# Patient Record
Sex: FEMALE | Race: BLACK OR AFRICAN AMERICAN
[De-identification: names, ages, dates, MRNs, and addresses within clinical notes are randomized per-mention and may not be internally consistent; named-entity substitution may affect disease eponyms.]

---

## 2018-09-25 ENCOUNTER — HOSPITAL ENCOUNTER (INPATIENT)
Dept: HOSPITAL 74 - JER | Age: 43
LOS: 3 days | Discharge: NURSING, REHAB OR LONG-TERM CARE | DRG: 346 | End: 2018-09-28
Attending: NURSE PRACTITIONER | Admitting: INTERNAL MEDICINE
Payer: COMMERCIAL

## 2018-09-25 VITALS — BODY MASS INDEX: 36.9 KG/M2
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
## 2018-09-25 DIAGNOSIS — F17.210: ICD-10-CM

## 2018-09-25 DIAGNOSIS — M24.542: ICD-10-CM

## 2018-09-25 DIAGNOSIS — E66.9: ICD-10-CM

## 2018-09-25 DIAGNOSIS — M24.541: ICD-10-CM

## 2018-09-25 DIAGNOSIS — M25.561: ICD-10-CM

## 2018-09-25 DIAGNOSIS — D64.89: ICD-10-CM
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
## 2018-09-25 DIAGNOSIS — M85.88: ICD-10-CM

## 2018-09-25 DIAGNOSIS — M25.571: ICD-10-CM
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
## 2018-09-25 DIAGNOSIS — M06.9: Primary | ICD-10-CM

## 2018-09-25 LAB
ALBUMIN SERPL-MCNC: 2.8 G/DL (ref 3.4–5)
ALP SERPL-CCNC: 79 U/L (ref 45–117)
ALT SERPL-CCNC: 16 U/L (ref 13–61)
ANION GAP SERPL CALC-SCNC: 7 MMOL/L (ref 8–16)
AST SERPL-CCNC: 23 U/L (ref 15–37)
BASOPHILS # BLD: 0.7 % (ref 0–2)
BILIRUB SERPL-MCNC: 0.6 MG/DL (ref 0.2–1)
BUN SERPL-MCNC: 17 MG/DL (ref 7–18)
CALCIUM SERPL-MCNC: 8.6 MG/DL (ref 8.5–10.1)
CHLORIDE SERPL-SCNC: 102 MMOL/L (ref 98–107)
CO2 SERPL-SCNC: 26 MMOL/L (ref 21–32)
CREAT SERPL-MCNC: 0.9 MG/DL (ref 0.55–1.3)
DEPRECATED RDW RBC AUTO: 15.8 % (ref 11.6–15.6)
EOSINOPHIL # BLD: 2 % (ref 0–4.5)
GLUCOSE SERPL-MCNC: 138 MG/DL (ref 74–106)
HCT VFR BLD CALC: 33.9 % (ref 32.4–45.2)
HGB BLD-MCNC: 11.2 GM/DL (ref 10.7–15.3)
LYMPHOCYTES # BLD: 19.9 % (ref 8–40)
MCH RBC QN AUTO: 28.3 PG (ref 25.7–33.7)
MCHC RBC AUTO-ENTMCNC: 33.1 G/DL (ref 32–36)
MCV RBC: 85.5 FL (ref 80–96)
MONOCYTES # BLD AUTO: 6.8 % (ref 3.8–10.2)
NEUTROPHILS # BLD: 70.6 % (ref 42.8–82.8)
PLATELET # BLD AUTO: 441 K/MM3 (ref 134–434)
PMV BLD: 7.9 FL (ref 7.5–11.1)
POTASSIUM SERPLBLD-SCNC: 4.3 MMOL/L (ref 3.5–5.1)
PROT SERPL-MCNC: 7.2 G/DL (ref 6.4–8.2)
RBC # BLD AUTO: 3.97 M/MM3 (ref 3.6–5.2)
SODIUM SERPL-SCNC: 135 MMOL/L (ref 136–145)
WBC # BLD AUTO: 8 K/MM3 (ref 4–10)

## 2018-09-25 PROCEDURE — G0378 HOSPITAL OBSERVATION PER HR: HCPCS

## 2018-09-25 RX ADMIN — NAPROXEN SCH MG: 500 TABLET ORAL at 22:33

## 2018-09-25 RX ADMIN — PREDNISONE SCH MG: 20 TABLET ORAL at 10:22

## 2018-09-25 RX ADMIN — NICOTINE SCH MG: 7 PATCH TRANSDERMAL at 10:28

## 2018-09-25 RX ADMIN — FERROUS SULFATE TAB EC 324 MG (65 MG FE EQUIVALENT) SCH MG: 324 (65 FE) TABLET DELAYED RESPONSE at 10:22

## 2018-09-25 RX ADMIN — ENOXAPARIN SODIUM SCH MG: 40 INJECTION SUBCUTANEOUS at 10:28

## 2018-09-25 RX ADMIN — Medication SCH TAB: at 10:22

## 2018-09-25 RX ADMIN — ENOXAPARIN SODIUM SCH: 40 INJECTION SUBCUTANEOUS at 10:35

## 2018-09-25 RX ADMIN — NAPROXEN SCH MG: 500 TABLET ORAL at 10:22

## 2018-09-25 RX ADMIN — FOLIC ACID SCH MG: 1 TABLET ORAL at 10:22

## 2018-09-25 NOTE — PN
Teaching Attending Note


Name of Resident: Jatin Hess





ATTENDING PHYSICIAN STATEMENT





I saw and evaluated the patient.


I reviewed the resident's note and discussed the case with the resident.


I agree with the resident's findings and plan as documented.








SUBJECTIVE: This is a 43 year old woman with a history of RA who comes to the 

ED with pain in her right shoulder, right knee, right ankle after a fall at 

home. She states she is unable to bear weight on her right leg because of pain. 

The fall occurred in her bathroom around 8pm yesterday. She says she tripped 

and fell, landing on her right side. She ran out of her medications 2 days ago.








OBJECTIVE:


 Vital Signs











 Period  Temp  Pulse  Resp  BP Sys/Tesfaye  Pulse Ox


 


 Last 24 Hr  98.0 F-98.3 F    16-20  118-148/82-92  98-98








HEART: S1S2, RRR


LUNGS: Clear


ABDOMEN: Obese, soft, non-tender, non-distended, normal BS


EXTREMITIES: No edema 


 Laboratory Tests











  09/25/18 09/25/18 09/25/18





  04:23 04:23 04:23


 


WBC   8.0 


 


RBC   3.97 


 


Hgb   11.2 


 


Hct   33.9 


 


MCV   85.5 


 


MCH   28.3 


 


MCHC   33.1 


 


RDW   15.8 H 


 


Plt Count   441 H 


 


MPV   7.9 


 


Absolute Neuts (auto)   5.6 


 


Neutrophils %   70.6 


 


Lymphocytes %   19.9 


 


Monocytes %   6.8 


 


Eosinophils %   2.0 


 


Basophils %   0.7 


 


Nucleated RBC %   0 


 


Sodium  135 L  


 


Potassium  4.3  


 


Chloride  102  


 


Carbon Dioxide  26  


 


Anion Gap  7 L  


 


BUN  17  


 


Creatinine  0.9  


 


Creat Clearance w eGFR  > 60  


 


Random Glucose  138 H  


 


Calcium  8.6  


 


Total Bilirubin  0.6  


 


AST  23  


 


ALT  16  


 


Alkaline Phosphatase  79  


 


C-Reactive Protein  8.6 H  


 


Total Protein  7.2  


 


Albumin  2.8 L  


 


Serum Pregnancy, Qual    Negative








 Home Medications











 Medication  Instructions  Recorded


 


Calcium Carbonate/Vitamin D3 1 each PO DAILY 09/25/18





[Calcium 600 + D3 Softgel]  


 


Ferrous Sulfate 325 mg PO DAILY 09/25/18


 


Folic Acid 1 mg PO DAILY 09/25/18


 


Multivit with Iron,Minerals 1 each PO DAILY 09/25/18





[Compete]  


 


Naproxen 500 mg PO Q12H 09/25/18


 


Prednisone [Deltasone] 20 mg PO DAILY 09/25/18


 


Prenatal Vit No.129/Iron/Folic 1 tablet PO DAILY 09/25/18





[Prenatal One Daily Tablet]  


 


Sulfasalazine [Sulfasalazine Dr] 500 mg PO TID 09/25/18














ASSESSMENT AND PLAN:


This is a 43 year old woman with a history of RA who presented to the ED with 

pain in her right shoulder, right knee, right ankle, and unable to bear weight 

on her right leg after a fall at home.





1. s/p fall


   - X-rays of right knee show osteopenia, mild arthritis, medial tibial 

plateau fracture unable to be ruled out


   - X-rays of right ankle/foot show osteopenia, arthritis, valgus deformities 

of toes, superior talus spur vs avulsion


   - X-rays of right humerus show no fracture or dislocation


   - CT of pelvis/lower extremities shows no fractures, minimal bilateral knee 

joint effusions, moderate OA of ankles and feet, ventral hernias, fibroids


   - PT evaluation


2. Rheumatoid arthritis


   - Continue Prednisone, Sulfasalazine


   - Rheumatology consult


3. Nicotine dependence


   - Start nicotine patch

## 2018-09-25 NOTE — PDOC
*Physical Exam





- Vital Signs


 Last Vital Signs











Temp Pulse Resp BP Pulse Ox


 


 98.0 F   108 H  20   148/92   98 


 


 09/25/18 00:40  09/25/18 00:40  09/25/18 00:40  09/25/18 00:40  09/25/18 00:40














- Physical Exam


Comments: 





09/25/18 07:23


I as you and care of this 43-year-old with history of rheumatoid arthritis 

presenting with complain of pain to right shoulder, right knee and ankle status 

post slip and fall yesterday. X-ray done with no sig joint findings. Patient 

refused to bear weight due to pain. Patient laying on the right side on the bed 

even though she complain of right-sided pain. CAT scan of the pelvis ordered as 

per hospitalist request. Patient to be admitted for pain control and physical 

therapy evaluation due to not wanting to bear weight.


General Appearance: Yes: Nourished.  No: Apparent Distress


HEENT: positive: Normal ENT Inspection


Neck: positive: Supple


Respiratory/Chest: positive: Lungs Clear.  negative: Chest Tender, Accessory 

Muscle Use


Cardiovascular: positive: Regular Rhythm, Regular Rate


Gastrointestinal/Abdominal: positive: Normal Bowel Sounds


Musculoskeletal: positive: Normal Inspection, Other (mild right knee swelling)


Extremity: positive: Normal Inspection


Neurologic: positive: Fully Oriented, Alert, Normal Mood/Affect, Normal Response

, Motor Strength 5/5





ED Treatment Course





- LABORATORY


CBC & Chemistry Diagram: 


 09/25/18 04:23





 09/25/18 04:23





- ADDITIONAL ORDERS


Additional order review: 


 Laboratory  Results











  09/25/18 09/25/18





  04:23 04:23


 


Sodium   135 L


 


Potassium   4.3


 


Chloride   102


 


Carbon Dioxide   26


 


Anion Gap   7 L


 


BUN   17


 


Creatinine   0.9


 


Creat Clearance w eGFR   > 60


 


Random Glucose   138 H


 


Calcium   8.6


 


Total Bilirubin   0.6


 


AST   23


 


ALT   16


 


Alkaline Phosphatase   79


 


C-Reactive Protein   8.6 H


 


Total Protein   7.2


 


Albumin   2.8 L


 


Serum Pregnancy, Qual  Negative 








 











  09/25/18





  04:23


 


RBC  3.97


 


MCV  85.5


 


MCHC  33.1


 


RDW  15.8 H


 


MPV  7.9


 


Neutrophils %  70.6


 


Lymphocytes %  19.9


 


Monocytes %  6.8


 


Eosinophils %  2.0


 


Basophils %  0.7














- Medications


Given in the ED: 


ED Medications














Discontinued Medications














Generic Name Dose Route Start Last Admin





  Trade Name Freq  PRN Reason Stop Dose Admin


 


Ketorolac Tromethamine  60 mg  09/25/18 01:15  09/25/18 02:06





  Toradol Injection -  IM  09/25/18 01:16  60 mg





  ONCE ONE   Administration





     





     





     





     


 


Oxycodone/Acetaminophen  1 combo  09/25/18 03:29  09/25/18 04:45





  Percocet 5/325 -  PO  09/25/18 03:30  1 combo





  ONCE ONE   Administration





     





     





     





     














Medical Decision Making





- Medical Decision Making





09/25/18 07:26


Patient with the history of rheumatoid arthritis present with complain of pain 

to right shoulder right knee and ankle status post fall. Patient given Toradol 

IM and Percocet for pain and still reported severe pain and does not want to 

ambulate or bear weight on right foot. Patient reported history of multiple 

admissions every time she has joint pains at [facility] and refused 

to be sent home today. Case discussed with hospitalist team and patient pending 

admission after CT scan of the pelvis.


09/25/18 09:22


CT of LE with no acute findings. patient admitted under Dr. Dougie Kim 

for observation and pain control





*DC/Admit/Observation/Transfer


Diagnosis at time of Disposition: 


 Unable to bear weight





Ankle pain, right


Qualifiers:


 Chronicity: acute Qualified Code(s): M25.571 - Pain in right ankle and joints 

of right foot





Knee pain, right


Qualifiers:


 Chronicity: acute Qualified Code(s): M25.561 - Pain in right knee





Upper extremity pain, anterior


Qualifiers:


 Laterality: right Qualified Code(s): M79.601 - Pain in right arm








- Discharge Dispostion


Condition at time of disposition: Stable


Decision to Admit order: Yes





- Referrals





- Patient Instructions





- Post Discharge Activity

## 2018-09-25 NOTE — HP
CHIEF COMPLAINT: inability to bear weight





PCP: none





HISTORY OF PRESENT ILLNESS:





43 year old female with a history of rheumatoid arthritis presents s/p fall in 

her bathroom last night and inability to bear weight. She reports that she fell 

in her bathroom at around 8pm last night. Reports tripping and falling on her 

right side, hitting her pelvis, R arm, R knee, and R ankle. Reports intense 

pain and swelling in her right knee and states that she is unable to bear 

weight. Reports that she was diagnosed with RA 4 years ago, and has since been 

on several medications (sulfasalazine, prednisone, and naproxen), but she ran 

out 2 days ago. She does not have a rheumatologist. She had previously gotten 

her meds from [facility] in [place]. Patient currently denies 

chest pain, SOB, nausea, vomiting, diarrhea, fevers, chills.





ER course was notable for:


(1) XR Knee, XR ankle/humerus


(2) 


(3)





Recent Travel:





PAST MEDICAL HISTORY: Rheumatoid arthritis





PAST SURGICAL HISTORY: none





Social History:


Smoking: smoked for 10 years, initially started at 1.5 ppd and reduced to 4 cigs

/day


Alcohol: never


Drugs: never





Family History: no history of DM, HTN, 


Allergies





No Known Allergies Allergy (Verified 09/25/18 00:46)


 








HOME MEDICATIONS:


 Home Medications











 Medication  Instructions  Recorded


 


Calcium Carbonate/Vitamin D3 1 each PO DAILY 09/25/18





[Calcium 600 + D3 Softgel]  


 


Ferrous Sulfate 325 mg PO DAILY 09/25/18


 


Folic Acid 1 mg PO DAILY 09/25/18


 


Multivit with Iron,Minerals 1 each PO DAILY 09/25/18





[Compete]  


 


Naproxen 500 mg PO Q12H 09/25/18


 


Prednisone [Deltasone] 20 mg PO DAILY 09/25/18


 


Prenatal Vit No.129/Iron/Folic 1 tablet PO DAILY 09/25/18





[Prenatal One Daily Tablet]  


 


Sulfasalazine [Sulfasalazine Dr] 500 mg PO TID 09/25/18








REVIEW OF SYSTEMS


CONSTITUTIONAL: 


Absent:  fever, chills, diaphoresis, generalized weakness, malaise, loss of 

appetite, weight change


HEENT: 


Absent:  rhinorrhea, nasal congestion, throat pain, throat swelling, difficulty 

swallowing, mouth swelling, ear pain, eye pain, visual changes


CARDIOVASCULAR: 


Absent: chest pain, syncope, palpitations, irregular heart rate, lightheadedness

, peripheral edema


RESPIRATORY: 


Absent: cough, shortness of breath, dyspnea with exertion, orthopnea, wheezing, 

stridor, hemoptysis


GASTROINTESTINAL:


Absent: abdominal pain, abdominal distension, nausea, vomiting, diarrhea, 

constipation, melena, hematochezia


GENITOURINARY: 


Absent: dysuria, frequency, urgency, hesitancy, hematuria, flank pain, genital 

pain


MUSCULOSKELETAL:  arthralgia, joint swelling


Absent: myalgia, back pain, neck pain


SKIN: 


Absent: rash, itching, pallor


HEMATOLOGIC/IMMUNOLOGIC: 


Absent: easy bleeding, easy bruising, lymphadenopathy, frequent infections


ENDOCRINE:


Absent: unexplained weight gain, unexplained weight loss, heat intolerance, 

cold intolerance


NEUROLOGIC: 


Absent: headache, focal weakness or paresthesias, dizziness, unsteady gait, 

seizure, mental status changes, bladder or bowel incontinence


PSYCHIATRIC: 


Absent: anxiety, depression, suicidal or homicidal ideation, hallucinations.








PHYSICAL EXAMINATION


 Vital Signs - 24 hr











  09/25/18 09/25/18





  00:40 08:21


 


Temperature 98.0 F 98.3 F


 


Pulse Rate 108 H 


 


Pulse Rate [  98 H





Left Apical]  


 


Respiratory 20 16





Rate  


 


Blood Pressure 148/92 


 


Blood Pressure  118/82





[Left Arm]  


 


O2 Sat by Pulse 98 98





Oximetry (%)  











GENERAL: A&Ox3, no acute distress


EYES: PERRLA, EOMI


ENT: Moist mucus membranes


NECK: No JVD


LUNGS: CTA, no wheezes


HEART: RRR, no murmurs


ABDOMEN: Obese, Soft, nontender, BS present


EXTREMITIES: 2+ pulses, swelling of b/l knees noted, moreso on the R. Patient 

has restricted ROM of B/L knees due to pain. Patient has flexion contractures 

of b/l hands and radial deviation b/l


NEUROLOGICAL:  Cranial nerves II-XII intact. 








 Laboratory Results - last 24 hr











  09/25/18 09/25/18 09/25/18





  04:23 04:23 04:23


 


WBC   8.0 


 


RBC   3.97 


 


Hgb   11.2 


 


Hct   33.9 


 


MCV   85.5 


 


MCH   28.3 


 


MCHC   33.1 


 


RDW   15.8 H 


 


Plt Count   441 H 


 


MPV   7.9 


 


Absolute Neuts (auto)   5.6 


 


Neutrophils %   70.6 


 


Lymphocytes %   19.9 


 


Monocytes %   6.8 


 


Eosinophils %   2.0 


 


Basophils %   0.7 


 


Nucleated RBC %   0 


 


Sodium  135 L  


 


Potassium  4.3  


 


Chloride  102  


 


Carbon Dioxide  26  


 


Anion Gap  7 L  


 


BUN  17  


 


Creatinine  0.9  


 


Creat Clearance w eGFR  > 60  


 


Random Glucose  138 H  


 


Calcium  8.6  


 


Total Bilirubin  0.6  


 


AST  23  


 


ALT  16  


 


Alkaline Phosphatase  79  


 


C-Reactive Protein  8.6 H  


 


Total Protein  7.2  


 


Albumin  2.8 L  


 


Serum Pregnancy, Qual    Negative











ASSESSMENT/PLAN:





43 year old female with a history of rheumatoid arthritis presents s/p fall in 

her bathroom last night and inability to bear weight





#S/P Fall: patient is currently stable, in no acute distress, but would like 

admission for inability to bear weight and evaluation for RA management


-patient is unable to bear weight


-pain control with home medications, add tylenol


-physical therapy


-no fractures on x rays





#Rheumatoid Arthritis: patient is not well controlled on current medications and

/or non-compliant with therapy


-continue sulfasalazine


-continue prednisone


-continue naproxen





#Anemia: stable with hgb 11.2


-continue ferrous sulfate 325 QD


-continue folate and multivitamin





#Smoking Cessation: counseled patient on smoking cessation


-will give nicotine patch





#FEN


-no standing fluids


-lytes normal


-regular diet





#Prophylaxis


-heparin 5000 subq TID





#Disposition


-admit obs





Visit type





- Emergency Visit


Emergency Visit: Yes


ED Registration Date: 09/25/18


Care time: The patient presented to the Emergency Department on the above date 

and was hospitalized for further evaluation of their emergent condition.





- New Patient


This patient is new to me today: Yes


Date on this admission: 09/25/18





- Critical Care


Critical Care patient: No





Hospitalist Screening





- Colonoscopy Questionnaire


Colonoscopy Questionnaire: 





Colonoscopy Questionnaire








-   Patient:


50 - 75 years old and never had a screening colonoscopy: Unknown


History of colon or rectal polyps, or CA: Unknown


History of IBD, Crohn's disease or UC: Unknown


History of abdominal radiation therapy as a child: Unknown





-   Relative:


1 with colon or rectal CA, or polyps at age 60 or younger: Unknown


Colon or rectal CA diagnosed at age 45 or younger: Unknown


Multiple relatives with colon or rectal CA: Unknown





-   Outcome:


Screening Result: Negative Screen

## 2018-09-25 NOTE — PDOC
History of Present Illness





- General


Chief Complaint: Injury


Stated Complaint: FALL


Time Seen by Provider: 09/25/18 01:06


History Source: Patient





- History of Present Illness


Initial Comments: 





09/25/18 02:37


43 year old female with rheumatoid arthritis s/p slip and fall in the bathroom  

at 8pm reports pain to right upper arm, right knee and right ankle pain. no 

deformity noted. + swelling to right knee. full rom to right shoulder. denies 

LOC/ dizziness/ head trauma 





PMHX: RA





Past History





- Past Medical History


Allergies/Adverse Reactions: 


 Allergies











Allergy/AdvReac Type Severity Reaction Status Date / Time


 


No Known Allergies Allergy   Verified 09/25/18 00:46











Home Medications: 


Ambulatory Orders





Calcium Carbonate/Vitamin D3 [Calcium 600 + D3 Softgel] 1 each PO DAILY 09/25/ 18 


Ferrous Sulfate 325 mg PO DAILY 09/25/18 


Folic Acid 1 mg PO DAILY 09/25/18 


Multivit with Iron,Minerals [Compete] 1 each PO DAILY 09/25/18 


Naproxen 500 mg PO Q12H 09/25/18 


Prednisone [Deltasone] 20 mg PO DAILY 09/25/18 


Prenatal Vit No.129/Iron/Folic [Prenatal One Daily Tablet] 1 tablet PO DAILY 09/ 25/18 


Sulfasalazine [Sulfasalazine Dr] 500 mg PO TID 09/25/18 











- Suicide/Smoking/Psychosocial Hx


Smoking History: Never smoked


Have you smoked in the past 12 months: No


Information on smoking cessation initiated: No


Hx Alcohol Use: No


Drug/Substance Use Hx: No





**Review of Systems





- Review of Systems


Able to Perform ROS?: Yes


Is the patient limited English proficient: No


Constitutional: No: Symptoms Reported, See HPI, Chills, Diaphoresis, Fever, 

Loss of Appetite, Malaise, Night Sweats, Weakness, Weight Stable, Unintentional 

Wgt. Loss, Unexplained wgt Loss, Other


Musculoskeletal: Yes: Joint Pain, Joint Swelling





*Physical Exam





- Vital Signs


 Last Vital Signs











Temp Pulse Resp BP Pulse Ox


 


 98.0 F   108 H  20   148/92   98 


 


 09/25/18 00:40  09/25/18 00:40  09/25/18 00:40  09/25/18 00:40  09/25/18 00:40














- Physical Exam


General Appearance: Yes: Appropriately Dressed, Mild Distress


Gastrointestinal/Abdominal: positive: Normal Bowel Sounds, Soft


Musculoskeletal: positive: Normal Inspection


Extremity: positive: Normal Capillary Refill, Normal Inspection, Other (limited 

rom of right ankle and right knee)


Integumentary: positive: Normal Color, Dry, Warm


Neurologic: positive: Fully Oriented, Alert





ED Treatment Course





- LABORATORY


CBC & Chemistry Diagram: 


 09/25/18 04:23





 09/25/18 04:23





Progress Note





- Progress Note


Progress Note: 





A: Joint pain








P; pain control





xray





labs





admit





Medical Decision Making





- Medical Decision Making





09/25/18 03:38


patient reports pain is too severe unable to weight bear. will give meds for 

pain, will check labs and admit for PT evaluation and pain control. patient 

reports that she usually goes to [facility] recently moved to 

[place]. lives 3 flights up wit no elevator. 


09/25/18 04:35





09/25/18 





case discussed with Dr. Canada. pending CT pelvis and lower extremity prior 

to disposition. 


09/25/18 07:11


Patient signed out to Bubba Dick. pending CT results., YURI to contact 

hospitalist once results are read. 





*DC/Admit/Observation/Transfer


Diagnosis at time of Disposition: 


 Unable to bear weight





Ankle pain, right


Qualifiers:


 Chronicity: acute Qualified Code(s): M25.571 - Pain in right ankle and joints 

of right foot





Knee pain, right


Qualifiers:


 Chronicity: acute Qualified Code(s): M25.561 - Pain in right knee





Upper extremity pain, anterior


Qualifiers:


 Laterality: right Qualified Code(s): M79.601 - Pain in right arm








- Discharge Dispostion


Condition at time of disposition: Stable


Decision to Admit order: Yes





- Referrals





- Patient Instructions





- Post Discharge Activity

## 2018-09-26 RX ADMIN — NAPROXEN SCH MG: 500 TABLET ORAL at 09:45

## 2018-09-26 RX ADMIN — PREDNISONE SCH MG: 20 TABLET ORAL at 09:44

## 2018-09-26 RX ADMIN — Medication SCH TAB: at 09:44

## 2018-09-26 RX ADMIN — NAPROXEN SCH MG: 500 TABLET ORAL at 23:15

## 2018-09-26 RX ADMIN — NICOTINE SCH MG: 7 PATCH TRANSDERMAL at 09:45

## 2018-09-26 RX ADMIN — Medication SCH TAB: at 09:46

## 2018-09-26 RX ADMIN — ENOXAPARIN SODIUM SCH: 40 INJECTION SUBCUTANEOUS at 09:54

## 2018-09-26 RX ADMIN — FERROUS SULFATE TAB EC 324 MG (65 MG FE EQUIVALENT) SCH MG: 324 (65 FE) TABLET DELAYED RESPONSE at 09:44

## 2018-09-26 RX ADMIN — FOLIC ACID SCH MG: 1 TABLET ORAL at 09:45

## 2018-09-26 NOTE — CONSULT
Consult


Consult Specialty:: Rheumatology





- History of Present Illness


History of Present Illness: 


43 year old female with a history of rheumatoid arthritis, admitted after a 

fall in her bathroom last night and inability to bear weight. 





HPI.  The patient reports that 3 days ago she had locking of the right knee and 

had a fall in the bathroom with trauma to the right arma, right knee and right 

ankle.  She was not able to stand up.  Since admission she had partial 

improvement, however she still cannot walk.





Rheumatoid arthritis,  Four years ago she developed pain and swelling in 

multiple joints.  She was treated by a rheumatologist until 4 months ago at 

[facility].  Apparently she had liver toxicity with Leflunomide and she 

was nevet treated with Methotrexate,  At the presewnt time she in on 

Sulfasalazine 500 mg TID.  She was never well controlled, she developed 

deformity in hands and continued having significant pain accompanied by 2 hour 

morning stiffness.  She has never been treated with a biological DMARD. 





In the hospital CT scan of the pelvis and right lower extremity wqas negative 

for fracture.  X rays of the right knee (not weight bearing) revealed femoral-

tibial joint space normaland narrowing of catie lateral aspecto of the patello-

femoral space.   X rays of the right foot reelaed narrowing of the talo-

navicular joint and erosions in the 1st and 2nd MTPs.   Labs CRP 8.6.














- History Source


History Provided By: Patient





- Past Medical History


...LMP: 09/15/18


...Pregnant: No





- Alcohol/Substance Use


Hx Alcohol Use: No





- Smoking History


Smoking history: Never smoked


Have you smoked in the past 12 months: No


Aproximately how many cigarettes per day: 4





Home Medications





- Allergies


Allergies/Adverse Reactions: 


 Allergies











Allergy/AdvReac Type Severity Reaction Status Date / Time


 


No Known Allergies Allergy   Verified 09/25/18 00:46














- Home Medications


Home Medications: 


Ambulatory Orders





Calcium Carbonate/Vitamin D3 [Calcium 600 + D3 Softgel] 1 each PO DAILY 09/25/ 18 


Ferrous Sulfate 325 mg PO DAILY 09/25/18 


Folic Acid 1 mg PO DAILY 09/25/18 


Multivit with Iron,Minerals [Compete] 1 each PO DAILY 09/25/18 


Naproxen 500 mg PO Q12H 09/25/18 


Prednisone [Deltasone] 20 mg PO DAILY 09/25/18 


Prenatal Vit No.129/Iron/Folic [Prenatal One Daily Tablet] 1 tablet PO DAILY 09/ 25/18 


Sulfasalazine [Sulfasalazine Dr] 500 mg PO TID 09/25/18 











Review of Systems





- Review of Systems


Constitutional: reports: Malaise


Eyes: reports: No Symptoms


HENT: reports: No Symptoms


Neck: reports: No Symptoms


Cardiovascular: reports: No Symptoms


Respiratory: reports: No Symptoms


Gastrointestinal: reports: No Symptoms


Genitourinary: reports: No Symptoms


Musculoskeletal: reports: Other (See HPI)


Integumentary: reports: No Symptoms


Neurological: reports: No Symptoms





Physical Exam


Vital Signs: 


 Vital Signs











Temperature  98.0 F   09/26/18 18:00


 


Pulse Rate  91 H  09/26/18 18:00


 


Respiratory Rate  20   09/26/18 18:00


 


Blood Pressure  107/56 L  09/26/18 18:00


 


O2 Sat by Pulse Oximetry (%)  98   09/26/18 16:00











Constitutional: Yes: Moderate Distress


Eyes: Yes: WNL


HENT: Yes: WNL


Neck: Yes: WNL


Cardiovascular: Yes: WNL


Respiratory: Yes: WNL


Gastrointestinal: Yes: WNL


Musculoskeletal: Yes: Other (Swelling of both wrists, all MCP's and PIP's and 

the right knee.   The right ankle was tender.  Subluxation of all MCP's an 

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


Labs: 


 CBC, BMP





 09/25/18 04:23 





 09/25/18 04:23 





 Laboratory Tests











  09/25/18





  04:23


 


Calcium  8.6


 


Total Bilirubin  0.6


 


AST  23


 


ALT  16


 


Alkaline Phosphatase  79


 


C-Reactive Protein  8.6 H


 


Total Protein  7.2


 


Albumin  2.8 L














Problem List





- Problems


(1) Rheumatoid arthritis


Assessment/Plan: 


Rheumatoid arthritis.  Disease very active with damage in multiple joints.


Plan:  Increase Sulfasalazine to 1 gr BID.    Start Methotrexate 15 mg/ week.  

The patient requires management by a rheumatologist as outpatient and to start 

a biological DMARD asap.


At the present time she has pain related to a fall with no fractures.  I 

suggest to continue conservative management and PT for this problem.





Code(s): M06.9 - RHEUMATOID ARTHRITIS, UNSPECIFIED

## 2018-09-26 NOTE — PN
Physical Exam: 


SUBJECTIVE: Patient seen and examined. She complains of pain in multiple joints.








OBJECTIVE:





 Vital Signs











 Period  Temp  Pulse  Resp  BP Sys/Tesfaye  Pulse Ox


 


 Last 24 Hr  97.7 F-98.6 F  73-96  20-20  120-142/69-99  98-98











GENERAL: The patient is awake, alert, and fully oriented, in no acute distress.


LUNGS: Breath sounds equal, clear to auscultation bilaterally, no wheezes, no 

crackles, no accessory muscle use. 


HEART: Regular rate and rhythm, S1, S2 without murmur, rub or gallop.


ABDOMEN: Obese, soft, nontender, nondistended, normoactive bowel sounds, no 

guarding, no rebound, no hepatosplenomegaly, no masses.


EXTREMITIES: 2+ pulses, warm, well-perfused, no edema. 











Active Medications











Generic Name Dose Route Start Last Admin





  Trade Name Freq  PRN Reason Stop Dose Admin


 


Acetaminophen  650 mg  09/25/18 09:00  09/26/18 07:03





  Tylenol -  PO   650 mg





  Q4H PRN   Administration





  PAIN LEVEL 4 - 6   





     





     





     


 


Calcium Carbonate/Cholecalciferol  1 tab  09/25/18 10:00  09/25/18 10:22





  Os-Norberto 500+D -  PO   1 tab





  DAILY KALEB   Administration





     





     





     





     


 


Enoxaparin Sodium  40 mg  09/25/18 10:00  09/25/18 10:35





  Lovenox -  SQ   Not Given





  DAILY KALEB   





     





     





     





     


 


Ferrous Sulfate  325 mg  09/25/18 10:00  09/25/18 10:22





  Feosol -  PO   325 mg





  DAILY KALEB   Administration





     





     





     





     


 


Folic Acid  1 mg  09/25/18 10:00  09/25/18 10:22





  Folic Acid -  PO   1 mg





  DAILY KALEB   Administration





     





     





     





     


 


Naproxen  500 mg  09/25/18 10:00  09/25/18 22:33





  Naprosyn -  PO   500 mg





  BID KALEB   Administration





     





     





     





     


 


Nicotine  7 mg  09/25/18 10:00  09/25/18 10:28





  Nicoderm Patch -  TD   7 mg





  DAILY KALEB   Administration





     





     





     





     


 


Prednisone  20 mg  09/25/18 10:00  09/25/18 10:22





  Deltasone -  PO   20 mg





  DAILY KALEB   Administration





     





     





     





     


 


Prenatal Multivit/Folic Acid/Iron  1 tab  09/25/18 10:00  09/25/18 10:22





  Prenatal Vitamins (Sjr) -  PO   1 tab





  DAILY KALEB   Administration





     





     





     





     


 


Sulfasalazine  500 mg  09/25/18 14:00  09/26/18 07:02





  Azulfidine En-Tabs -  PO   500 mg





  TID KALEB   Administration





     





     





     





     











ASSESSMENT/PLAN:


This is a 43 year old woman with a history of RA who presented to the ED with 

pain in her right shoulder, right knee, right ankle, and unable to bear weight 

on her right leg after a fall at home.





1. s/p fall


   - X-rays of right knee show osteopenia, mild arthritis, medial tibial 

plateau fracture unable to be ruled out


   - X-rays of right ankle/foot show osteopenia, arthritis, valgus deformities 

of toes, superior talus spur vs avulsion


   - X-rays of right humerus show no fracture or dislocation


   - CT of pelvis/lower extremities shows no fractures, minimal bilateral knee 

joint effusions, moderate OA of ankles and feet, ventral hernias, fibroids


   - PT evaluation


2. Rheumatoid arthritis


   - Continue Prednisone, Sulfasalazine, Naprosyn


   - Oxycodone for pain control


   - Awaiting rheumatology consult


3. Nicotine dependence


   - Continue nicotine patch


4. Obesity with BMI 36.9

## 2018-09-27 RX ADMIN — ENOXAPARIN SODIUM SCH: 40 INJECTION SUBCUTANEOUS at 09:58

## 2018-09-27 RX ADMIN — ENOXAPARIN SODIUM SCH MG: 40 INJECTION SUBCUTANEOUS at 09:42

## 2018-09-27 RX ADMIN — NICOTINE SCH MG: 7 PATCH TRANSDERMAL at 09:42

## 2018-09-27 RX ADMIN — NAPROXEN SCH MG: 500 TABLET ORAL at 21:07

## 2018-09-27 RX ADMIN — Medication SCH TAB: at 09:52

## 2018-09-27 RX ADMIN — FOLIC ACID SCH MG: 1 TABLET ORAL at 09:41

## 2018-09-27 RX ADMIN — FERROUS SULFATE TAB EC 324 MG (65 MG FE EQUIVALENT) SCH MG: 324 (65 FE) TABLET DELAYED RESPONSE at 09:41

## 2018-09-27 RX ADMIN — NAPROXEN SCH MG: 500 TABLET ORAL at 09:42

## 2018-09-27 RX ADMIN — Medication SCH TAB: at 09:42

## 2018-09-27 RX ADMIN — PREDNISONE SCH MG: 20 TABLET ORAL at 09:42

## 2018-09-27 NOTE — PN
Physical Exam: 


SUBJECTIVE: Patient seen and examined. She has no new complaints. She ambulated 

5 feet with PT yesterday and says she will be unable to walk up stairs at home. 

She is erquesting rehab.








OBJECTIVE:





 Vital Signs











 Period  Temp  Pulse  Resp  BP Sys/Tesfaye  Pulse Ox


 


 Last 24 Hr  97.7 F-98.1 F  70-91  20-20  107-146/56-84  98-98











GENERAL: The patient is awake, alert, and fully oriented, in no acute distress.


LUNGS: Breath sounds equal, clear to auscultation bilaterally, no wheezes, no 

crackles, no accessory muscle use. 


HEART: Regular rate and rhythm, S1, S2 without murmur, rub or gallop.


ABDOMEN: Soft, nontender, nondistended, normoactive bowel sounds, no guarding, 

no rebound, no hepatosplenomegaly, no masses.


EXTREMITIES: 2+ pulses, warm, well-perfused, no edema. 








Active Medications











Generic Name Dose Route Start Last Admin





  Trade Name Freq  PRN Reason Stop Dose Admin


 


Acetaminophen  650 mg  09/26/18 09:21  





  Tylenol -  PO   





  Q4H PRN   





  PAIN LEVEL 1-5   





     





     





     


 


Calcium Carbonate/Cholecalciferol  1 tab  09/25/18 10:00  09/26/18 09:44





  Os-Norberto 500+D -  PO   1 tab





  DAILY KALEB   Administration





     





     





     





     


 


Enoxaparin Sodium  40 mg  09/25/18 10:00  09/26/18 09:54





  Lovenox -  SQ   Not Given





  DAILY KALEB   





     





     





     





     


 


Ferrous Sulfate  325 mg  09/25/18 10:00  09/26/18 09:44





  Feosol -  PO   325 mg





  DAILY KALEB   Administration





     





     





     





     


 


Folic Acid  1 mg  09/25/18 10:00  09/26/18 09:45





  Folic Acid -  PO   1 mg





  DAILY KALEB   Administration





     





     





     





     


 


Methotrexate  15 mg  09/27/18 08:00  





  Mexate -  PO   





  Q7D KALEB   





     





     





     





     


 


Naproxen  500 mg  09/25/18 10:00  09/26/18 23:15





  Naprosyn -  PO   500 mg





  BID KALEB   Administration





     





     





     





     


 


Nicotine  7 mg  09/25/18 10:00  09/26/18 09:45





  Nicoderm Patch -  TD   7 mg





  DAILY KALEB   Administration





     





     





     





     


 


Oxycodone HCl  10 mg  09/26/18 09:20  09/26/18 18:51





  Roxicodone -  PO   10 mg





  Q4H PRN   Administration





  PAIN LEVEL 6-10   





     





     





     


 


Prednisone  20 mg  09/25/18 10:00  09/26/18 09:44





  Deltasone -  PO   20 mg





  DAILY KALEB   Administration





     





     





     





     


 


Prenatal Multivit/Folic Acid/Iron  1 tab  09/25/18 10:00  09/26/18 09:46





  Prenatal Vitamins (Sjr) -  PO   1 tab





  DAILY KALEB   Administration





     





     





     





     


 


Sulfasalazine  1,000 mg  09/27/18 10:00  





  Azulfidine En-Tabs -  PO   





  BID KALEB   





     





     





     





     











ASSESSMENT/PLAN:


This is a 43 year old woman with a history of RA who presented to the ED with 

pain in her right shoulder, right knee, right ankle, and unable to bear weight 

on her right leg after a fall at home.





1. s/p fall


   - X-rays of right knee show osteopenia, mild arthritis, medial tibial 

plateau fracture unable to be ruled out


   - X-rays of right ankle/foot show osteopenia, arthritis, valgus deformities 

of toes, superior talus spur vs avulsion


   - X-rays of right humerus show no fracture or dislocation


   - CT of pelvis/lower extremities shows no fractures, minimal bilateral knee 

joint effusions, moderate OA of ankles and feet, ventral hernias, fibroids


   - PT evaluation


2. Rheumatoid arthritis


   - Rheumatology consult appreciated


   - Continue Prednisone, Naprosyn


   - Sulfasalazine increased


   - Methotrexate added


   - Continue oxycodone as needed for pain


   - Outpatient rheumatology follow up for DMARD


3. Nicotine dependence


   - Continue nicotine patch


4. Obesity with BMI 36.9


5. Disposition


   - Continue PT


   - Plan for subacute rehab

## 2018-09-28 VITALS — DIASTOLIC BLOOD PRESSURE: 78 MMHG | TEMPERATURE: 98.3 F | HEART RATE: 75 BPM | SYSTOLIC BLOOD PRESSURE: 129 MMHG

## 2018-09-28 LAB
ALBUMIN SERPL-MCNC: 2.7 G/DL (ref 3.4–5)
ALP SERPL-CCNC: 76 U/L (ref 45–117)
ALT SERPL-CCNC: 17 U/L (ref 13–61)
ANION GAP SERPL CALC-SCNC: 9 MMOL/L (ref 8–16)
AST SERPL-CCNC: 16 U/L (ref 15–37)
BASOPHILS # BLD: 0.4 % (ref 0–2)
BILIRUB CONJ SERPL-MCNC: < 0.2 MG/DL (ref 0–0.2)
BILIRUB SERPL-MCNC: 0.1 MG/DL (ref 0.2–1)
BUN SERPL-MCNC: 14 MG/DL (ref 7–18)
CALCIUM SERPL-MCNC: 9.3 MG/DL (ref 8.5–10.1)
CHLORIDE SERPL-SCNC: 104 MMOL/L (ref 98–107)
CO2 SERPL-SCNC: 26 MMOL/L (ref 21–32)
CREAT SERPL-MCNC: 0.6 MG/DL (ref 0.55–1.3)
DEPRECATED RDW RBC AUTO: 15.8 % (ref 11.6–15.6)
EOSINOPHIL # BLD: 0.3 % (ref 0–4.5)
GLUCOSE SERPL-MCNC: 151 MG/DL (ref 74–106)
HCT VFR BLD CALC: 30.7 % (ref 32.4–45.2)
HGB BLD-MCNC: 9.8 GM/DL (ref 10.7–15.3)
LYMPHOCYTES # BLD: 8.9 % (ref 8–40)
MAGNESIUM SERPL-MCNC: 1.8 MG/DL (ref 1.8–2.4)
MCH RBC QN AUTO: 27.8 PG (ref 25.7–33.7)
MCHC RBC AUTO-ENTMCNC: 32.1 G/DL (ref 32–36)
MCV RBC: 86.6 FL (ref 80–96)
MONOCYTES # BLD AUTO: 2.5 % (ref 3.8–10.2)
NEUTROPHILS # BLD: 87.9 % (ref 42.8–82.8)
PLATELET # BLD AUTO: 408 K/MM3 (ref 134–434)
PMV BLD: 7.6 FL (ref 7.5–11.1)
POTASSIUM SERPLBLD-SCNC: 4.4 MMOL/L (ref 3.5–5.1)
PROT SERPL-MCNC: 6.8 G/DL (ref 6.4–8.2)
RBC # BLD AUTO: 3.54 M/MM3 (ref 3.6–5.2)
SODIUM SERPL-SCNC: 139 MMOL/L (ref 136–145)
WBC # BLD AUTO: 8.7 K/MM3 (ref 4–10)

## 2018-09-28 RX ADMIN — ENOXAPARIN SODIUM SCH: 40 INJECTION SUBCUTANEOUS at 11:02

## 2018-09-28 RX ADMIN — FERROUS SULFATE TAB EC 324 MG (65 MG FE EQUIVALENT) SCH MG: 324 (65 FE) TABLET DELAYED RESPONSE at 11:02

## 2018-09-28 RX ADMIN — NAPROXEN SCH MG: 500 TABLET ORAL at 11:03

## 2018-09-28 RX ADMIN — FOLIC ACID SCH MG: 1 TABLET ORAL at 11:02

## 2018-09-28 RX ADMIN — Medication SCH TAB: at 11:02

## 2018-09-28 RX ADMIN — PREDNISONE SCH MG: 20 TABLET ORAL at 11:02

## 2018-09-28 RX ADMIN — Medication SCH TAB: at 11:04

## 2018-09-28 RX ADMIN — NICOTINE SCH MG: 7 PATCH TRANSDERMAL at 11:02

## 2018-09-28 NOTE — DS
Physical Exam: 


SUBJECTIVE: Patient seen and examined








OBJECTIVE:





 Vital Signs











 Period  Temp  Pulse  Resp  BP Sys/Tesfaye  Pulse Ox


 


 Last 24 Hr  97.7 F-98.4 F  71-90  20-20  129-158/73-92  98-98








PHYSICAL EXAM








GENERAL: The patient is awake, alert, and fully oriented, in no acute distress.


LUNGS: Breath sounds equal, clear to auscultation bilaterally, no wheezes, no 

crackles, no accessory muscle use. 


HEART: Regular rate and rhythm, S1, S2 without murmur, rub or gallop.


ABDOMEN: Obese, soft, nontender, nondistended, normoactive bowel sounds, no 

guarding, no rebound, no hepatosplenomegaly, no masses.


EXTREMITIES: 2+ pulses, warm, well-perfused, no edema. 





LABS


 Laboratory Results - last 24 hr











  09/28/18 09/28/18 09/28/18





  14:20 14:20 14:20


 


WBC   8.7 


 


RBC   3.54 L 


 


Hgb   9.8 L 


 


Hct   30.7 L 


 


MCV   86.6 


 


MCH   27.8 


 


MCHC   32.1 


 


RDW   15.8 H 


 


Plt Count   408 


 


MPV   7.6 


 


Absolute Neuts (auto)   7.7 


 


Neutrophils %   87.9 H D 


 


Lymphocytes %   8.9  D 


 


Monocytes %   2.5 L 


 


Eosinophils %   0.3  D 


 


Basophils %   0.4 


 


Nucleated RBC %   0 


 


Sodium  139  


 


Potassium  4.4  


 


Chloride  104  


 


Carbon Dioxide  26  


 


Anion Gap  9  


 


BUN  14  


 


Creatinine  0.6  


 


Creat Clearance w eGFR  > 60  


 


Random Glucose  151 H  


 


Calcium  9.3  


 


Magnesium  1.8  


 


Total Bilirubin    0.1 L


 


Direct Bilirubin    < 0.2


 


AST    16


 


ALT    17


 


Alkaline Phosphatase    76


 


Total Protein    6.8


 


Albumin    2.7 L











HOSPITAL COURSE:





Date of Admission:09/28/18





Date of Discharge: 09/28/18





This is a 43 year old woman with a history of RA who presented to the ED with 

pain in her right shoulder, right knee, right ankle, and unable to bear weight 

on her right leg after a fall at home.





1. s/p fall


   - X-rays of right knee show osteopenia, mild arthritis, medial tibial 

plateau fracture unable to be ruled out


   - X-rays of right ankle/foot show osteopenia, arthritis, valgus deformities 

of toes, superior talus spur vs avulsion


   - X-rays of right humerus show no fracture or dislocation


   - CT of pelvis/lower extremities shows no fractures, minimal bilateral knee 

joint effusions, moderate OA of ankles and feet, ventral hernias, fibroids


   - PT evaluation





2. Rheumatoid arthritis


   - Continue Prednisone, Sulfasalazine, Naprosyn


   - Oxycodone for pain control


   


3. Nicotine dependence


   - Continue nicotine patch


4. Obesity with BMI 36.9





Minutes to complete discharge: 35





Discharge Summary


Reason For Visit: RIGHT KNEE AND ANKLE PAIN, INABILITY TO BEAR WEIGH


Current Active Problems





Ankle pain, right (Acute)


Knee pain, right (Acute)


Rheumatoid arthritis (Acute)


Unable to bear weight (Acute)


Upper extremity pain, anterior (Acute)








Condition: Improved





- Instructions


Disposition: SKILLED NURSING FACILITY





- Home Medications


Comprehensive Discharge Medication List: 


Ambulatory Orders





Calcium Carbonate/Vitamin D3 [Calcium 600 + D3 Softgel] 1 each PO DAILY 09/25/ 18 


Ferrous Sulfate 325 mg PO DAILY 09/25/18 


Folic Acid 1 mg PO DAILY 09/25/18 


Multivit with Iron,Minerals [Compete] 1 each PO DAILY 09/25/18 


Naproxen 500 mg PO Q12H 09/25/18 


Prednisone [Deltasone] 20 mg PO DAILY 09/25/18 


Prenatal Vit No.129/Iron/Folic [Prenatal One Daily Tablet] 1 tablet PO DAILY 09/ 25/18 


Sulfasalazine [Sulfasalazine Dr] 500 mg PO TID 09/25/18 








This patient is new to me today: Yes


Date on this admission: 09/28/18


Emergency Visit: Yes


ED Registration Date: 09/28/18


Care time: The patient presented to the Emergency Department on the above date 

and was hospitalized for further evaluation of their emergent condition.


Critical Care patient: No





- Discharge Referral


Referred to [facility] No

## 2019-02-14 ENCOUNTER — HOSPITAL ENCOUNTER (INPATIENT)
Dept: HOSPITAL 74 - JER | Age: 44
LOS: 8 days | Discharge: NURSING, REHAB OR LONG-TERM CARE | DRG: 346 | End: 2019-02-22
Attending: INTERNAL MEDICINE | Admitting: GENERAL ACUTE CARE HOSPITAL
Payer: COMMERCIAL

## 2019-02-14 VITALS — BODY MASS INDEX: 32.5 KG/M2

## 2019-02-14 DIAGNOSIS — K43.9: ICD-10-CM

## 2019-02-14 DIAGNOSIS — T38.0X5A: ICD-10-CM

## 2019-02-14 DIAGNOSIS — F17.210: ICD-10-CM

## 2019-02-14 DIAGNOSIS — E66.9: ICD-10-CM

## 2019-02-14 DIAGNOSIS — R60.0: ICD-10-CM

## 2019-02-14 DIAGNOSIS — D50.9: ICD-10-CM

## 2019-02-14 DIAGNOSIS — M06.9: Primary | ICD-10-CM

## 2019-02-14 DIAGNOSIS — R82.71: ICD-10-CM

## 2019-02-14 DIAGNOSIS — K03.81: ICD-10-CM
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
## 2019-02-14 DIAGNOSIS — Z91.14: ICD-10-CM

## 2019-02-14 LAB
ALBUMIN SERPL-MCNC: 2.6 G/DL (ref 3.4–5)
ALP SERPL-CCNC: 65 U/L (ref 45–117)
ALT SERPL-CCNC: 8 U/L (ref 13–61)
ANION GAP SERPL CALC-SCNC: 8 MMOL/L (ref 8–16)
AST SERPL-CCNC: 8 U/L (ref 15–37)
BASOPHILS # BLD: 1 % (ref 0–2)
BILIRUB SERPL-MCNC: 0.2 MG/DL (ref 0.2–1)
BUN SERPL-MCNC: 8 MG/DL (ref 7–18)
CALCIUM SERPL-MCNC: 8.6 MG/DL (ref 8.5–10.1)
CHLORIDE SERPL-SCNC: 105 MMOL/L (ref 98–107)
CO2 SERPL-SCNC: 27 MMOL/L (ref 21–32)
CREAT SERPL-MCNC: 0.6 MG/DL (ref 0.55–1.3)
DEPRECATED RDW RBC AUTO: 18.1 % (ref 11.6–15.6)
EOSINOPHIL # BLD: 1.9 % (ref 0–4.5)
GLUCOSE SERPL-MCNC: 106 MG/DL (ref 74–106)
HCT VFR BLD CALC: 24.7 % (ref 32.4–45.2)
HGB BLD-MCNC: 8.1 GM/DL (ref 10.7–15.3)
LYMPHOCYTES # BLD: 23.1 % (ref 8–40)
MCH RBC QN AUTO: 24.2 PG (ref 25.7–33.7)
MCHC RBC AUTO-ENTMCNC: 32.7 G/DL (ref 32–36)
MCV RBC: 74.2 FL (ref 80–96)
MONOCYTES # BLD AUTO: 4.5 % (ref 3.8–10.2)
NEUTROPHILS # BLD: 69.5 % (ref 42.8–82.8)
PLATELET # BLD AUTO: 628 K/MM3 (ref 134–434)
PMV BLD: 7.4 FL (ref 7.5–11.1)
POTASSIUM SERPLBLD-SCNC: 3.5 MMOL/L (ref 3.5–5.1)
PROT SERPL-MCNC: 7.3 G/DL (ref 6.4–8.2)
RBC # BLD AUTO: 3.33 M/MM3 (ref 3.6–5.2)
SODIUM SERPL-SCNC: 140 MMOL/L (ref 136–145)
WBC # BLD AUTO: 7.3 K/MM3 (ref 4–10)

## 2019-02-14 PROCEDURE — G0378 HOSPITAL OBSERVATION PER HR: HCPCS

## 2019-02-14 RX ADMIN — FERROUS SULFATE TAB EC 324 MG (65 MG FE EQUIVALENT) SCH MG: 324 (65 FE) TABLET DELAYED RESPONSE at 17:10

## 2019-02-14 RX ADMIN — ENOXAPARIN SODIUM SCH: 40 INJECTION SUBCUTANEOUS at 11:56

## 2019-02-14 RX ADMIN — METHOTREXATE SCH MG: 2.5 TABLET ORAL at 22:17

## 2019-02-14 RX ADMIN — NAPROXEN SCH MG: 500 TABLET ORAL at 21:14

## 2019-02-14 RX ADMIN — NICOTINE SCH MG: 14 PATCH, EXTENDED RELEASE TRANSDERMAL at 11:50

## 2019-02-14 RX ADMIN — NAPROXEN SCH MG: 500 TABLET ORAL at 17:10

## 2019-02-14 NOTE — EKG
Test Reason : 

Blood Pressure : ***/*** mmHG

Vent. Rate : 085 BPM     Atrial Rate : 085 BPM

   P-R Int : 158 ms          QRS Dur : 092 ms

    QT Int : 356 ms       P-R-T Axes : 046 045 043 degrees

   QTc Int : 423 ms

 

*** POOR DATA QUALITY, INTERPRETATION MAY BE ADVERSELY AFFECTED

NORMAL SINUS RHYTHM

NONSPECIFIC T WAVE ABNORMALITY

ABNORMAL ECG

NO PREVIOUS ECGS AVAILABLE

Confirmed by VANNESA VENTURA MD ([id number]) on 2/14/2019 3:40:15 PM

 

Referred By:             Confirmed By:VANNESA VENTURA MD

## 2019-02-14 NOTE — PN
Teaching Attending Note


Name of Resident: Murray Johnson





ATTENDING PHYSICIAN STATEMENT





I saw and evaluated the patient.


I reviewed the resident's note and discussed the case with the resident.


I agree with the resident's findings and plan as documented.








SUBJECTIVE: Complains of R knee and L ankle pain/swelling, inability to weight 

bear. No fever/chills. No history of trauma.








OBJECTIVE: Afebrile, Hemodynamically Stable.


 Last Vital Signs











Temp Pulse Resp BP Pulse Ox


 


 98.3 F   76   18   120/72   100 


 


 02/14/19 13:43  02/14/19 13:43  02/14/19 13:43  02/14/19 13:43  02/14/19 11:15








HEENT - Atraumatic, Normocephalic


Heart - S1, S2, RRR


Lungs - clear to auscultation


Abdomen - High BMI. Soft, non-tender. Bowel Sounds normal.


Neuro - AAO x 3. Tone/Power normal all 4 extremities.


MS - bilateral knee and ankle swelling and tenderness.


Extremities - no calf tenderness. [place] deformities bilateral hands.


 Laboratory Results - last 24 hr











  02/14/19 02/14/19





  05:18 05:18


 


WBC  7.3 


 


RBC  3.33 L 


 


Hgb  8.1 L 


 


Hct  24.7 L D 


 


MCV  74.2 L 


 


MCH  24.2 L D 


 


MCHC  32.7 


 


RDW  18.1 H 


 


Plt Count  628 H D 


 


MPV  7.4 L 


 


Absolute Neuts (auto)  5.0 


 


Neutrophils %  69.5  D 


 


Lymphocytes %  23.1  D 


 


Monocytes %  4.5 


 


Eosinophils %  1.9  D 


 


Basophils %  1.0 


 


Nucleated RBC %  0 


 


Sodium   140


 


Potassium   3.5


 


Chloride   105


 


Carbon Dioxide   27


 


Anion Gap   8


 


BUN   8


 


Creatinine   0.6


 


Creat Clearance w eGFR   > 60


 


Random Glucose   106


 


Calcium   8.6


 


Total Bilirubin   0.2


 


AST   8 L


 


ALT   8 L


 


Alkaline Phosphatase   65


 


Troponin I   < 0.02


 


Total Protein   7.3


 


Albumin   2.6 L








 Current Medications











Generic Name Dose Route Start Last Admin





  Trade Name Freq  PRN Reason Stop Dose Admin


 


Enoxaparin Sodium  40 mg  02/14/19 10:00  02/14/19 11:56





  Lovenox -  SQ   Not Given





  [facility]ILY [facility]   





     





     





     





     


 


Ferrous Sulfate  325 mg  02/14/19 17:30  





  Feosol -  PO   





  BIDWM [facility]   





     





     





     





     


 


Naproxen  500 mg  02/14/19 18:00  





  Naprosyn -  PO   





  BID KALEB   





     





     





     





     


 


Nicotine  14 mg  02/14/19 10:00  02/14/19 11:50





  Nicoderm Patch -  TD   14 mg





  DAILY KALEB   Administration





     





     





     





     


 


Sulfasalazine  1,000 mg  02/14/19 10:00  02/14/19 11:51





  Azulfidine En-Tabs -  PO   1,000 mg





  BID KALEB   Administration





     





     





     





     








 Home Medications











 Medication  Instructions  Recorded


 


Calcium Carbonate/Vitamin D3 1 each PO DAILY 09/25/18





[Calcium 600 + Vit D 400 Softgl]  


 


Ferrous Sulfate 325 mg PO DAILY 09/25/18


 


Folic Acid 1 mg PO DAILY 09/25/18


 


Multivit with Iron,Minerals 1 each PO DAILY 09/25/18





[Compete]  


 


Naproxen 500 mg PO Q12H 09/25/18


 


Prednisone [Deltasone] 20 mg PO DAILY 09/25/18


 


Prenatal Vit No.129/Iron/Folic 1 tablet PO DAILY 09/25/18





[Prenatal One Daily Tablet]  


 


Acetaminophen [Tylenol .Regular 650 mg PO Q4H PRN  tablet 09/28/18





Strength -]  


 


Methotrexate [Mexate -] 15 mg PO Th@1000  tablet 09/28/18


 


Nicotine Patch [Nicoderm Patch -] 7 mg TD DAILY  patch 09/28/18


 


Sulfasalazine [Azulfidine En-Tabs 1,000 mg PO BID  tablet. 09/28/18





-]  














ASSESSMENT AND PLAN:





44 year old female with history of RA (non-compliant with MTX, Sulfasalazine, 

Prednisone), Chronic REID, presents with inability to weight bear due to knee 

and ankle pain/swelling as well as R shoulder pain. No history of trauma. 





1. Acute RA Flare - sec to non-compliance with anti-rheumatoid meds (recently 

moved to [place] and was unable to attend her physician's office for refills) 


Shoulder Xray - R glenohumeral joint arthropathy, no fracture/dislocation.


Resume Sulfasalazine, Naproxen


Given 1 dose of Prednisone in ED


Rheumatology consulted for recommendations regarding steroid and MTX therapy.


PT





2. Chronic REID with thrombocytosis


H/H 2.1/24.3/MCV 74


Thrombocytosis likely sec to Iron deficiency


Last [place] 2015 - polyps as per patient.


Will give 1 dose IV Venofer and subsequent oral Ferrous Sulfate.


Will add Ferritin/Iron studies to earlier labs.





3. Smoker - Counseled - offered Nicotine patch.





DVT Px - Lovenox.

## 2019-02-14 NOTE — HP
CHIEF COMPLAINT: unable to ambulate





PCP: No PCP currently





HISTORY OF PRESENT ILLNESS:


45 y/o F w/PMH of RA and iron def anemia presents to the ER for inability to 

ambulate. She was walking down the stairs of her building today when she felt 

her R ankle buckle and had difficulty bearing weight and lowered herself down 

using banister slowly. No head trauma or LOC reported and was with her daughter 

at the time. EMS was called and was brought to the ER. She has had progressive 

worsening of her arthritis over the last 2 weeks and increased swelling in her 

joints over the last 2 weeks. She has not had her meds for 2 weeks due to not 

having a PCP or any other medical follow up while moving to [place] 7 months 

ago from [place]. She also complains of R shoulder pain and decreased ROM in 

both shoulders. She normally ambulates using walker. She denies any N/V/F/C, HA

, light-headedness, CP, SOB, abd pain, change in urinary or bowel habits.





ER course was notable for:


(1) Prednisone, Toradol, Naproxen


(2) R Shoulder XR, R ankle/foot XR


(3)





Recent Travel: No recent travel





PAST MEDICAL HISTORY: RA, iron def anemia





PAST SURGICAL HISTORY: Abd ex lap 1996





Social History:


Smoking: Currently smokes 1ppd, smokes on and off for most adult life


Alcohol: Denies


Drugs:  Denies





Family History: n-c


Allergies





No Known Allergies Allergy (Verified 02/14/19 01:54)


 








HOME MEDICATIONS:


 Home Medications











 Medication  Instructions  Recorded


 


Calcium Carbonate/Vitamin D3 1 each PO DAILY 09/25/18





[Calcium 600 + Vit D 400 Softgl]  


 


Ferrous Sulfate 325 mg PO DAILY 09/25/18


 


Folic Acid 1 mg PO DAILY 09/25/18


 


Multivit with Iron,Minerals 1 each PO DAILY 09/25/18





[Compete]  


 


Naproxen 500 mg PO Q12H 09/25/18


 


Prednisone [Deltasone] 20 mg PO DAILY 09/25/18


 


Prenatal Vit No.129/Iron/Folic 1 tablet PO DAILY 09/25/18





[Prenatal One Daily Tablet]  


 


Acetaminophen [Tylenol .Regular 650 mg PO Q4H PRN  tablet 09/28/18





Strength -]  


 


Methotrexate [Mexate -] 15 mg PO Th@1000  tablet 09/28/18


 


Nicotine Patch [Nicoderm Patch -] 7 mg TD DAILY  patch 09/28/18


 


Sulfasalazine [Azulfidine En-Tabs 1,000 mg PO BID  tablet. 09/28/18





-]  








REVIEW OF SYSTEMS


CONSTITUTIONAL: 


Absent:  fever, chills, diaphoresis, generalized weakness


CARDIOVASCULAR: 


Absent: chest pain, syncope, lightheadedness


RESPIRATORY: 


Absent: cough, shortness of breath, dyspnea with exertion


GASTROINTESTINAL:


Absent: abdominal pain, nausea, vomiting, diarrhea


GENITOURINARY: 


Absent: dysuria, frequency, urgency, hesitancy, hematuria, flank pain, genital 

pain


MUSCULOSKELETAL:  R shoulder pain, R ankle pain


NEUROLOGIC: 


Absent: headache, dizziness








PHYSICAL EXAMINATION


 Vital Signs - 24 hr











  02/14/19 02/14/19





  00:55 06:53


 


Temperature 97.6 F 98.4 F


 


Pulse Rate 98 H 


 


Pulse Rate [  71





Right Apical]  


 


Respiratory 20 17





Rate  


 


Blood Pressure 157/88 


 


Blood Pressure  132/76





[Right Arm]  


 


O2 Sat by Pulse 98 98





Oximetry (%)  











GENERAL: Awake, alert, and fully oriented, in no acute distress.


HEAD: Normal with no signs of trauma.


EYES: extraocular movements intact, sclera anicteric, conjunctiva clear.


EARS, NOSE, THROAT: Ears normal, nares patent, Moist mucous membranes.


NECK: Normal range of motion, supple


LUNGS: Breath sounds equal, clear to auscultation bilaterally. No wheezes, and 

no crackles. No accessory muscle use.


HEART: Regular rate and rhythm, normal S1 and S2


ABDOMEN: Soft, obese, nontender, not distended, normoactive bowel sounds, no 

guarding, no rebound, no masses.


MUSCULOSKELETAL: [place] necking of b/l hands. Decreased ROM of b/l UE at 

shoulders. Decreased ROM at b/l ankles.


LOWER EXTREMITIES: warm, well-perfused. Swelling at b/l ankles.


NEUROLOGICAL:  Cranial nerves II-XII grossly intact. Normal speech. Gait not 

observed.


PSYCHIATRIC: Cooperative. Good eye contact. Appropriate mood and affect.


SKIN: Warm, dry





 Laboratory Results - last 24 hr











  02/14/19 02/14/19





  05:18 05:18


 


WBC  7.3 


 


RBC  3.33 L 


 


Hgb  8.1 L 


 


Hct  24.7 L D 


 


MCV  74.2 L 


 


MCH  24.2 L D 


 


MCHC  32.7 


 


RDW  18.1 H 


 


Plt Count  628 H D 


 


MPV  7.4 L 


 


Absolute Neuts (auto)  5.0 


 


Neutrophils %  69.5  D 


 


Lymphocytes %  23.1  D 


 


Monocytes %  4.5 


 


Eosinophils %  1.9  D 


 


Basophils %  1.0 


 


Nucleated RBC %  0 


 


Sodium   140


 


Potassium   3.5


 


Chloride   105


 


Carbon Dioxide   27


 


Anion Gap   8


 


BUN   8


 


Creatinine   0.6


 


Creat Clearance w eGFR   > 60


 


Random Glucose   106


 


Calcium   8.6


 


Total Bilirubin   0.2


 


AST   8 L


 


ALT   8 L


 


Alkaline Phosphatase   65


 


Troponin I   < 0.02


 


Total Protein   7.3


 


Albumin   2.6 L











Imaging:


R shoulder XR: R glenohumeral joint atrophy. No acute fracture. No dislocation.


R ankle/foot XR: pending read





Active Medications





Enoxaparin Sodium (Lovenox -)  40 mg SQ DAILY KALEB


Ferrous Sulfate (Feosol -)  325 mg PO BIDWM KALEB


Naproxen (Naprosyn -)  500 mg PO BID KALEB


Nicotine (Nicoderm Patch -)  14 mg TD DAILY KALEB


Sulfasalazine (Azulfidine En-Tabs -)  1,000 mg PO BID KALEB








ASSESSMENT/PLAN:


45 y/o F w/PMH of RA and iron def anemia presents to the ER for inability to 

ambulate. Admitted for observation for RA flare.





-RA flare


   -Rheumatology consult


   -Sulfasalazine 1000 mg bid


   -Naproxen 500 mg q12h


   -PT


   -Given Toradol, Prednisone, Naproxen in ER.


   -Will hold off on methotrexate and prednisone and will follow rheum 

recommendations


   -f/u foot/ankle XR





-Iron deficiency anemia


   -IV Venofer 200 mg


   -Fe Sulfate 325 mg bid


   -Follow H/H, no active signs of bleeding





-Thrombocytosis


   -likely secondary to iron deficiency


   -continue to monitor





-Nicotine dependance


   -14 mg TD nicotine patch





-DVT ppx


   -Lovenox





-FEN


   -No fluids at this time


   -Monitor electrolytes


   -Regular diet





-Dispo: admit for observation to m/s. Spoke with social work office and left 

message for SW covering ER for placement into NH for rehab.





Visit type





- Emergency Visit


Emergency Visit: Yes


ED Registration Date: 02/14/19


Care time: The patient presented to the Emergency Department on the above date 

and was hospitalized for further evaluation of their emergent condition.





- New Patient


This patient is new to me today: Yes


Date on this admission: 02/14/19





- Critical Care


Critical Care patient: No

## 2019-02-14 NOTE — CONSULT
Consult


Consult Specialty:: Rheumatology





- History of Present Illness


History of Present Illness: 


44 year old female with a history of rheumatoid arthritis, admitted after right 

ankle buckling and inability to bear weight. 





Rheumatoid arthritis,  The patient has a 4 year history of arthralgia in 

multiple joints however she probably has rheumatoid arthritis for a much longer 

period based on the severity of joint damage.. She was treated by a 

rheumatologist until 9 months ago at [facility].  Apparently she had 

liver toxicity with Leflunomide and she had never treated with Methotrexate or 

a biological DMARD. She has not seen a rheumatologist as outpatient in [place].  She 

was admitted to this hospital on September 2018 and since then she is in 

Sulfasalazine 1 gr BID and Methotrexate 15 mg once per week and Prednisone was 

increased to 20 mg/d.  At the present time she rports pain mainly in shoulders 

and hands.  Since she was admitted she has not been able to stand up.  She has 

3 hours morning stiffness.      The patient reports she has an appointment 

pending with a rheumatologist.








- History Source


History Provided By: Patient, Medical Record





- Past Medical History


...LMP: 09/15/18





- Alcohol/Substance Use


Hx Alcohol Use: No





- Smoking History


Smoking history: Never smoked


Have you smoked in the past 12 months: No


Aproximately how many cigarettes per day: 4





Home Medications





- Allergies


Allergies/Adverse Reactions: 


 Allergies











Allergy/AdvReac Type Severity Reaction Status Date / Time


 


No Known Allergies Allergy   Verified 02/14/19 01:54














- Home Medications


Home Medications: 


Ambulatory Orders





Calcium Carbonate/Vitamin D3 [Calcium 600 + Vit D 400 Softgl] 1 each PO DAILY 09 /25/18 


Ferrous Sulfate 325 mg PO DAILY 09/25/18 


Folic Acid 1 mg PO DAILY 09/25/18 


Multivit with Iron,Minerals [Compete] 1 each PO DAILY 09/25/18 


Naproxen 500 mg PO Q12H 09/25/18 


Prednisone [Deltasone] 20 mg PO DAILY 09/25/18 


Prenatal Vit No.129/Iron/Folic [Prenatal One Daily Tablet] 1 tablet PO DAILY 09/ 25/18 


Acetaminophen [Tylenol .Regular Strength -] 650 mg PO Q4H PRN  tablet 09/28/18 


Methotrexate [Mexate -] 15 mg PO Th@1000  tablet 09/28/18 


Nicotine Patch [Nicoderm Patch -] 7 mg TD DAILY  patch 09/28/18 


Sulfasalazine [Azulfidine En-Tabs -] 1,000 mg PO BID  tablet. 09/28/18 











Review of Systems





- Review of Systems


Constitutional: reports: Malaise


Eyes: reports: No Symptoms


HENT: reports: No Symptoms


Neck: reports: No Symptoms


Cardiovascular: reports: No Symptoms


Respiratory: reports: No Symptoms


Gastrointestinal: reports: No Symptoms


Musculoskeletal: reports: Other (See HPI)


Endocrine: reports: No Symptoms





Physical Exam


Vital Signs: 


 Vital Signs











Temperature  98.0 F   02/14/19 19:29


 


Pulse Rate  77   02/14/19 19:29


 


Respiratory Rate  18   02/14/19 19:29


 


Blood Pressure  130/73   02/14/19 19:29


 


O2 Sat by Pulse Oximetry (%)  100   02/14/19 11:15











Constitutional: Yes: Mild Distress


Eyes: Yes: WNL


HENT: Yes: WNL


Neck: Yes: WNL


Cardiovascular: Yes: WNL


Respiratory: Yes: WNL


Gastrointestinal: Yes: WNL


Musculoskeletal: Yes: Other (Wrists, MCPs in both hands were swollen. Right 3rd 

and 4th PIPs.  Significant damage in hands with MCP subluxation and Butoniere 

deformities. Tendenrss and swelling ion both knees.  tenderness in ankles.)


Labs: 


 CBC, BMP





 02/14/19 05:18 





 02/14/19 05:18 











Problem List





- Problems


(1) Rheumatoid arthritis


Assessment/Plan: 


Sero-positive rheumatoid arthritis not treated adequately for many years and 

significant damage in multiple joints.   Admitted with ankle buckling. She 

requires higher dose of Methotrexate and treatment with biological DMARDs that 

hopefully she will get as outpatient once she gets with a scheduled 

rheumatology appointment.


Plan: Increase Methotrexate to 20 mg once per week.  Continue Sulfasalazine 1 

gr BID.  I suggest to start tapering down Prednisone.


Code(s): M06.9 - RHEUMATOID ARTHRITIS, UNSPECIFIED

## 2019-02-15 LAB
ALBUMIN SERPL-MCNC: 2.4 G/DL (ref 3.4–5)
ALP SERPL-CCNC: 60 U/L (ref 45–117)
ALT SERPL-CCNC: 9 U/L (ref 13–61)
ANION GAP SERPL CALC-SCNC: 8 MMOL/L (ref 8–16)
APPEARANCE UR: (no result)
AST SERPL-CCNC: 11 U/L (ref 15–37)
BACTERIA #/AREA URNS HPF: (no result) /HPF
BASOPHILS # BLD: 1.2 % (ref 0–2)
BILIRUB SERPL-MCNC: 0.1 MG/DL (ref 0.2–1)
BILIRUB UR STRIP.AUTO-MCNC: NEGATIVE MG/DL
BUN SERPL-MCNC: 10 MG/DL (ref 7–18)
CALCIUM SERPL-MCNC: 8.4 MG/DL (ref 8.5–10.1)
CHLORIDE SERPL-SCNC: 107 MMOL/L (ref 98–107)
CO2 SERPL-SCNC: 25 MMOL/L (ref 21–32)
COLOR UR: YELLOW
CREAT SERPL-MCNC: 0.6 MG/DL (ref 0.55–1.3)
DEPRECATED RDW RBC AUTO: 18.3 % (ref 11.6–15.6)
EOSINOPHIL # BLD: 2 % (ref 0–4.5)
EPITH CASTS URNS QL MICRO: (no result) /HPF
GLUCOSE SERPL-MCNC: 93 MG/DL (ref 74–106)
HCT VFR BLD CALC: 23.5 % (ref 32.4–45.2)
HGB BLD-MCNC: 7.8 GM/DL (ref 10.7–15.3)
KETONES UR QL STRIP: NEGATIVE
LEUKOCYTE ESTERASE UR QL STRIP.AUTO: (no result)
LYMPHOCYTES # BLD: 28.3 % (ref 8–40)
MAGNESIUM SERPL-MCNC: 2 MG/DL (ref 1.8–2.4)
MCH RBC QN AUTO: 24.7 PG (ref 25.7–33.7)
MCHC RBC AUTO-ENTMCNC: 33.3 G/DL (ref 32–36)
MCV RBC: 74.1 FL (ref 80–96)
MONOCYTES # BLD AUTO: 7.4 % (ref 3.8–10.2)
MUCOUS THREADS URNS QL MICRO: (no result)
NEUTROPHILS # BLD: 61.1 % (ref 42.8–82.8)
NITRITE UR QL STRIP: NEGATIVE
PH UR: 5 [PH] (ref 5–8)
PHOSPHATE SERPL-MCNC: 4.1 MG/DL (ref 2.5–4.9)
PLATELET # BLD AUTO: 557 K/MM3 (ref 134–434)
PMV BLD: 7.8 FL (ref 7.5–11.1)
POTASSIUM SERPLBLD-SCNC: 3.8 MMOL/L (ref 3.5–5.1)
PROT SERPL-MCNC: 6.8 G/DL (ref 6.4–8.2)
PROT UR QL STRIP: NEGATIVE
PROT UR QL STRIP: NEGATIVE
RBC # BLD AUTO: 3.18 M/MM3 (ref 3.6–5.2)
SODIUM SERPL-SCNC: 140 MMOL/L (ref 136–145)
SP GR UR: 1.01 (ref 1.01–1.03)
UROBILINOGEN UR STRIP-MCNC: 2 MG/DL (ref 0.2–1)
WBC # BLD AUTO: 6 K/MM3 (ref 4–10)

## 2019-02-15 RX ADMIN — ENOXAPARIN SODIUM SCH MG: 40 INJECTION SUBCUTANEOUS at 09:48

## 2019-02-15 RX ADMIN — PREDNISONE SCH MG: 5 TABLET ORAL at 09:47

## 2019-02-15 RX ADMIN — RANITIDINE SCH MG: 150 TABLET ORAL at 15:02

## 2019-02-15 RX ADMIN — FERROUS SULFATE TAB EC 324 MG (65 MG FE EQUIVALENT) SCH MG: 324 (65 FE) TABLET DELAYED RESPONSE at 17:04

## 2019-02-15 RX ADMIN — NAPROXEN SCH MG: 500 TABLET ORAL at 09:51

## 2019-02-15 RX ADMIN — ENOXAPARIN SODIUM SCH: 40 INJECTION SUBCUTANEOUS at 09:53

## 2019-02-15 RX ADMIN — NICOTINE SCH MG: 14 PATCH, EXTENDED RELEASE TRANSDERMAL at 09:47

## 2019-02-15 RX ADMIN — FERROUS SULFATE TAB EC 324 MG (65 MG FE EQUIVALENT) SCH MG: 324 (65 FE) TABLET DELAYED RESPONSE at 08:17

## 2019-02-15 RX ADMIN — FOLIC ACID SCH MG: 1 TABLET ORAL at 09:47

## 2019-02-15 RX ADMIN — NAPROXEN SCH MG: 500 TABLET ORAL at 21:23

## 2019-02-15 NOTE — PN
Physical Exam: 


SUBJECTIVE: Patient seen and examined at bedside. Rheumatologic complaints (pain

, stiffness, weakness, fatigue) unchanged. On treatment. No other acute 

complaints. 








OBJECTIVE:





 Vital Signs











 Period  Temp  Pulse  Resp  BP Sys/Tesfaye  Pulse Ox


 


 Last 24 Hr  97.8 F-98.7 F  71-80  18-18  113-140/69-75  











GENERAL: A&Ox3, NAD


HEENT: NC/AT, PERRLA, EOMI, MMM


NECK: Normal range of motion, supple, no LAD, no JVD


LUNGS: CTA b/l


HEART: RRR no m/r/g


ABDOMEN: +bs, soft, obese, NT, ND, prominent reducible ventral hernia


MUSCULOSKELETAL: Lawrence neck and boutonniere deformities of hand b/l, decreased 

ROM of shoulders and ankles b/l


EXT: 2+ pulses, wwp, no edema


NEUROLOGICAL: CNs, motor, sensory systems w/o focal deficit, generalized lower 

ext weakness b/l


PSYCHIATRIC: normal mood, normal affect


SKIN: warm, dry, normal turgor

















 Laboratory Results - last 24 hr











  02/14/19 02/15/19 02/15/19





  05:18 01:15 06:00


 


WBC    6.0


 


RBC    3.18 L


 


Hgb    7.8 L


 


Hct    23.5 L


 


MCV    74.1 L


 


MCH    24.7 L


 


MCHC    33.3


 


RDW    18.3 H


 


Plt Count    557 H


 


MPV    7.8


 


Absolute Neuts (auto)    3.7


 


Neutrophils %    61.1


 


Lymphocytes %    28.3  D


 


Monocytes %    7.4


 


Eosinophils %    2.0


 


Basophils %    1.2


 


Nucleated RBC %    0


 


ESR   


 


Sodium  140  


 


Potassium  3.5  


 


Chloride  105  


 


Carbon Dioxide  27  


 


Anion Gap  8  


 


BUN  8  


 


Creatinine  0.6  


 


Creat Clearance w eGFR  > 60  


 


Random Glucose  106  


 


Calcium  8.6  


 


Phosphorus   


 


Magnesium   


 


Iron   


 


Ferritin  32.2  


 


Total Bilirubin  0.2  


 


AST  8 L  


 


ALT  8 L  


 


Alkaline Phosphatase  65  


 


Troponin I  < 0.02  


 


Total Protein  7.3  


 


Albumin  2.6 L  


 


Urine Color   Yellow 


 


Urine Appearance   Cloudy 


 


Urine pH   5.0 


 


Ur Specific Gravity   1.015 


 


Urine Protein   Negative 


 


Urine Glucose (UA)   Negative 


 


Urine Ketones   Negative 


 


Urine Blood   1+ H 


 


Urine Nitrite   Negative 


 


Urine Bilirubin   Negative 


 


Urine Urobilinogen   2.0 H 


 


Ur Leukocyte Esterase   2+ H 


 


Urine WBC (Auto)   33 


 


Urine RBC (Auto)   7 


 


Ur Epithelial Cells   Moderate 


 


Urine Bacteria   Rare 


 


Urine Mucus   Rare 














  02/15/19 02/15/19 02/15/19





  06:00 06:00 06:00


 


WBC   


 


RBC   


 


Hgb   


 


Hct   


 


MCV   


 


MCH   


 


MCHC   


 


RDW   


 


Plt Count   


 


MPV   


 


Absolute Neuts (auto)   


 


Neutrophils %   


 


Lymphocytes %   


 


Monocytes %   


 


Eosinophils %   


 


Basophils %   


 


Nucleated RBC %   


 


ESR    112 H


 


Sodium  140  


 


Potassium  3.8  


 


Chloride  107  


 


Carbon Dioxide  25  


 


Anion Gap  8  


 


BUN  10  


 


Creatinine  0.6  


 


Creat Clearance w eGFR  > 60  


 


Random Glucose  93  


 


Calcium  8.4 L  


 


Phosphorus  4.1  


 


Magnesium  2.0  


 


Iron   Cancelled 


 


Ferritin   


 


Total Bilirubin  0.1 L  


 


AST  11 L  


 


ALT  9 L  


 


Alkaline Phosphatase  60  


 


Troponin I   


 


Total Protein  6.8  


 


Albumin  2.4 L  


 


Urine Color   


 


Urine Appearance   


 


Urine pH   


 


Ur Specific Gravity   


 


Urine Protein   


 


Urine Glucose (UA)   


 


Urine Ketones   


 


Urine Blood   


 


Urine Nitrite   


 


Urine Bilirubin   


 


Urine Urobilinogen   


 


Ur Leukocyte Esterase   


 


Urine WBC (Auto)   


 


Urine RBC (Auto)   


 


Ur Epithelial Cells   


 


Urine Bacteria   


 


Urine Mucus   








Active Medications











Generic Name Dose Route Start Last Admin





  Trade Name Freq  PRN Reason Stop Dose Admin


 


Enoxaparin Sodium  40 mg  02/14/19 10:00  02/15/19 09:53





  Lovenox -  SQ   Not Given





  DAILY [facility]   





     





     





     





     


 


Ferrous Sulfate  325 mg  02/14/19 17:30  02/15/19 08:17





  Feosol -  PO   325 mg





  BIDWM KALEB   Administration





     





     





     





     


 


Folic Acid  1 mg  02/15/19 10:00  02/15/19 09:47





  Folic Acid -  PO   1 mg





  DAILY KALEB   Administration





     





     





     





     


 


Methotrexate  20 mg  02/14/19 20:15  02/14/19 22:17





  Mexate -  PO   20 mg





  Th@1000 KALEB   Administration





     





     





     





     


 


Naproxen  500 mg  02/14/19 18:00  02/15/19 09:51





  Naprosyn -  PO   500 mg





  BID KALEB   Administration





     





     





     





     


 


Nicotine  14 mg  02/14/19 10:00  02/15/19 09:47





  Nicoderm Patch -  TD   14 mg





  DAILY KALEB   Administration





     





     





     





     


 


Prednisone  15 mg  02/15/19 10:00  02/15/19 09:47





  Deltasone -  PO   15 mg





  DAILY KALEB   Administration





     





     





     





     


 


Sulfasalazine  1,000 mg  02/14/19 10:00  02/15/19 09:48





  Azulfidine En-Tabs -  PO   1,000 mg





  BID KALEB   Administration





     





     





     





     











ASSESSMENT/PLAN:


43 y/o F w/ PMHx RA and iron def anemia p/w progressive weakness and inability 

to ambulate after fall, admitted for obs for RA flare.





#RA flare


-Pt relocated from [place] to [place] several months ago and has not been able 

to re-establish care, has been of DMARDs


-Rheumatology consulted, Dr. Og following


-imaging negative


-cont sulfasalazine, prednisone, MTX, Naproxen





#Iron deficiency anemia


-IV Venofer 200 mg


-Fe Sulfate 325 mg bid


-Follow H/H, no active signs of bleeding





-Thrombocytosis


-likely secondary to iron deficiency


-continue to monitor CBC





#Nicotine dependance


-14 mg TD nicotine patch





#asymptomatic bacteruria


-mildly positive UA: 2+ LE, 33 WBC, negative nitrites


-no dysuric symptoms


-no Tx at this time, recommend outpt f/u





#PPx


-DVT: Lovenox


-GI: Zantac





#FEN


-No IVF


-monitor and correct electrolytes


-regular diet





#code


-full





#dispo


-cont to monitor on m/s obs


-awaiting SNF placement








Visit type





- Emergency Visit


Emergency Visit: No





- New Patient


This patient is new to me today: No





- Critical Care


Critical Care patient: No

## 2019-02-15 NOTE — PN
Teaching Attending Note


Name of Resident: Mickey Ram





ATTENDING PHYSICIAN STATEMENT





I saw and evaluated the patient.


I reviewed the resident's note and discussed the case with the resident.


I agree with the resident's findings and plan as documented.








SUBJECTIVE: Complains of knee and ankle pain/swelling, inability to weight 

bear. No fever/chills. No history of trauma.








OBJECTIVE: Afebrile, Hemodynamically Stable.


 Last Vital Signs











Temp Pulse Resp BP Pulse Ox


 


 98.7 F   71   18   121/69   96 


 


 02/15/19 13:37  02/15/19 13:37  02/15/19 13:37  02/15/19 13:37  02/15/19 09:00











 


HEENT - Atraumatic, Normocephalic


Heart - S1, S2, RRR


Lungs - clear to auscultation


Abdomen - High BMI. Soft, non-tender. Bowel Sounds normal.


Neuro - AAO x 3. Tone/Power normal all 4 extremities.


MS - bilateral knee and ankle swelling and tenderness.


Extremities - no calf tenderness. Boutonnier's and swan-neck deformities 

bilateral hands.


 Laboratory Results - last 24 hr











  02/14/19 02/15/19 02/15/19





  05:18 01:15 06:00


 


WBC    6.0


 


RBC    3.18 L


 


Hgb    7.8 L


 


Hct    23.5 L


 


MCV    74.1 L


 


MCH    24.7 L


 


MCHC    33.3


 


RDW    18.3 H


 


Plt Count    557 H


 


MPV    7.8


 


Absolute Neuts (auto)    3.7


 


Neutrophils %    61.1


 


Lymphocytes %    28.3  D


 


Monocytes %    7.4


 


Eosinophils %    2.0


 


Basophils %    1.2


 


Nucleated RBC %    0


 


ESR   


 


Sodium  140  


 


Potassium  3.5  


 


Chloride  105  


 


Carbon Dioxide  27  


 


Anion Gap  8  


 


BUN  8  


 


Creatinine  0.6  


 


Creat Clearance w eGFR  > 60  


 


Random Glucose  106  


 


Calcium  8.6  


 


Phosphorus   


 


Magnesium   


 


Iron   


 


Ferritin  32.2  


 


Total Bilirubin  0.2  


 


AST  8 L  


 


ALT  8 L  


 


Alkaline Phosphatase  65  


 


Troponin I  < 0.02  


 


Total Protein  7.3  


 


Albumin  2.6 L  


 


Urine Color   Yellow 


 


Urine Appearance   Cloudy 


 


Urine pH   5.0 


 


Ur Specific Gravity   1.015 


 


Urine Protein   Negative 


 


Urine Glucose (UA)   Negative 


 


Urine Ketones   Negative 


 


Urine Blood   1+ H 


 


Urine Nitrite   Negative 


 


Urine Bilirubin   Negative 


 


Urine Urobilinogen   2.0 H 


 


Ur Leukocyte Esterase   2+ H 


 


Urine WBC (Auto)   33 


 


Urine RBC (Auto)   7 


 


Ur Epithelial Cells   Moderate 


 


Urine Bacteria   Rare 


 


Urine Mucus   Rare 














  02/15/19 02/15/19 02/15/19





  06:00 06:00 06:00


 


WBC   


 


RBC   


 


Hgb   


 


Hct   


 


MCV   


 


MCH   


 


MCHC   


 


RDW   


 


Plt Count   


 


MPV   


 


Absolute Neuts (auto)   


 


Neutrophils %   


 


Lymphocytes %   


 


Monocytes %   


 


Eosinophils %   


 


Basophils %   


 


Nucleated RBC %   


 


ESR    112 H


 


Sodium  140  


 


Potassium  3.8  


 


Chloride  107  


 


Carbon Dioxide  25  


 


Anion Gap  8  


 


BUN  10  


 


Creatinine  0.6  


 


Creat Clearance w eGFR  > 60  


 


Random Glucose  93  


 


Calcium  8.4 L  


 


Phosphorus  4.1  


 


Magnesium  2.0  


 


Iron   Cancelled 


 


Ferritin   


 


Total Bilirubin  0.1 L  


 


AST  11 L  


 


ALT  9 L  


 


Alkaline Phosphatase  60  


 


Troponin I   


 


Total Protein  6.8  


 


Albumin  2.4 L  


 


Urine Color   


 


Urine Appearance   


 


Urine pH   


 


Ur Specific Gravity   


 


Urine Protein   


 


Urine Glucose (UA)   


 


Urine Ketones   


 


Urine Blood   


 


Urine Nitrite   


 


Urine Bilirubin   


 


Urine Urobilinogen   


 


Ur Leukocyte Esterase   


 


Urine WBC (Auto)   


 


Urine RBC (Auto)   


 


Ur Epithelial Cells   


 


Urine Bacteria   


 


Urine Mucus   








 Current Medications











Generic Name Dose Route Start Last Admin





  Trade Name Freq  PRN Reason Stop Dose Admin


 


Enoxaparin Sodium  40 mg  02/14/19 10:00  02/15/19 09:53





  Lovenox -  SQ   Not Given





  DAILY [facility]   





     





     





     





     


 


Ferrous Sulfate  325 mg  02/14/19 17:30  02/15/19 17:04





  Feosol -  PO   325 mg





  BIDWM KALEB   Administration





     





     





     





     


 


Folic Acid  1 mg  02/15/19 10:00  02/15/19 09:47





  Folic Acid -  PO   1 mg





  DAILY KALEB   Administration





     





     





     





     


 


Methotrexate  20 mg  02/14/19 20:15  02/14/19 22:17





  Mexate -  PO   20 mg





  Th@1000 KALEB   Administration





     





     





     





     


 


Naproxen  500 mg  02/14/19 18:00  02/15/19 09:51





  Naprosyn -  PO   500 mg





  BID KALEB   Administration





     





     





     





     


 


Nicotine  14 mg  02/14/19 10:00  02/15/19 09:47





  Nicoderm Patch -  TD   14 mg





  DAILY KALEB   Administration





     





     





     





     


 


Prednisone  15 mg  02/15/19 10:00  02/15/19 09:47





  Deltasone -  PO   15 mg





  DAILY KALEB   Administration





     





     





     





     


 


Ranitidine HCl  150 mg  02/15/19 14:15  02/15/19 15:02





  Zantac -  PO   150 mg





  DAILY KALEB   Administration





     





     





     





     


 


Sulfasalazine  1,000 mg  02/14/19 10:00  02/15/19 09:48





  Azulfidine En-Tabs -  PO   1,000 mg





  BID KALEB   Administration





     





     





     





     











 





ASSESSMENT AND PLAN:





44 year old female with history of RA (non-compliant with MTX, Sulfasalazine, 

Prednisone), Chronic REID, presents with inability to weight bear due to knee 

and ankle pain/swelling as well as R shoulder pain. No history of trauma. 





1. Acute RA Flare - sec to non-compliance with anti-rheumatoid meds (recently 

moved to [place] and was unable to attend her physician's office for refills) 


Shoulder Xray - R glenohumeral joint arthropathy, no fracture/dislocation.


Resumed on Sulfasalazine, Naproxen, Prednisone, and once weekly Methotrexate.


Rheumatology evaluated and recommends re-initiating home meds and slow tapering 

of Prednisone.


PT and likely rehab placement





2. Chronic REID with thrombocytosis


H/H 7.8/23.5/MCV 74.1


Thrombocytosis likely sec to Iron deficiency


Last [place] 2015 - polyps as per patient.


Given 1 dose IV Venofer and placed on oral Ferrous Sulfate.


Further GI work-up as out-patient.





3. Smoker - Counseled - offered Nicotine patch.





DVT Px - Lovenox.

## 2019-02-16 LAB
ANION GAP SERPL CALC-SCNC: 8 MMOL/L (ref 8–16)
BASOPHILS # BLD: 0.5 % (ref 0–2)
BUN SERPL-MCNC: 14 MG/DL (ref 7–18)
CALCIUM SERPL-MCNC: 8.2 MG/DL (ref 8.5–10.1)
CHLORIDE SERPL-SCNC: 106 MMOL/L (ref 98–107)
CO2 SERPL-SCNC: 26 MMOL/L (ref 21–32)
CREAT SERPL-MCNC: 0.5 MG/DL (ref 0.55–1.3)
DEPRECATED RDW RBC AUTO: 18 % (ref 11.6–15.6)
EOSINOPHIL # BLD: 1 % (ref 0–4.5)
GLUCOSE SERPL-MCNC: 89 MG/DL (ref 74–106)
HCT VFR BLD CALC: 25.3 % (ref 32.4–45.2)
HGB BLD-MCNC: 8.1 GM/DL (ref 10.7–15.3)
LYMPHOCYTES # BLD: 25.8 % (ref 8–40)
MAGNESIUM SERPL-MCNC: 2 MG/DL (ref 1.8–2.4)
MCH RBC QN AUTO: 24.1 PG (ref 25.7–33.7)
MCHC RBC AUTO-ENTMCNC: 32.2 G/DL (ref 32–36)
MCV RBC: 74.7 FL (ref 80–96)
MONOCYTES # BLD AUTO: 5.4 % (ref 3.8–10.2)
NEUTROPHILS # BLD: 67.3 % (ref 42.8–82.8)
PHOSPHATE SERPL-MCNC: 3.2 MG/DL (ref 2.5–4.9)
PLATELET # BLD AUTO: 606 K/MM3 (ref 134–434)
PMV BLD: 7.2 FL (ref 7.5–11.1)
POTASSIUM SERPLBLD-SCNC: 4.1 MMOL/L (ref 3.5–5.1)
RBC # BLD AUTO: 3.38 M/MM3 (ref 3.6–5.2)
SODIUM SERPL-SCNC: 140 MMOL/L (ref 136–145)
WBC # BLD AUTO: 7.5 K/MM3 (ref 4–10)

## 2019-02-16 RX ADMIN — RANITIDINE SCH MG: 150 TABLET ORAL at 10:35

## 2019-02-16 RX ADMIN — NAPROXEN SCH MG: 500 TABLET ORAL at 21:29

## 2019-02-16 RX ADMIN — PREDNISONE SCH MG: 5 TABLET ORAL at 10:33

## 2019-02-16 RX ADMIN — NAPROXEN SCH MG: 500 TABLET ORAL at 10:34

## 2019-02-16 RX ADMIN — FOLIC ACID SCH MG: 1 TABLET ORAL at 10:34

## 2019-02-16 RX ADMIN — NICOTINE SCH MG: 14 PATCH, EXTENDED RELEASE TRANSDERMAL at 10:35

## 2019-02-16 RX ADMIN — ENOXAPARIN SODIUM SCH: 40 INJECTION SUBCUTANEOUS at 10:34

## 2019-02-16 RX ADMIN — ACETAMINOPHEN PRN MG: 325 TABLET ORAL at 15:34

## 2019-02-16 RX ADMIN — FERROUS SULFATE TAB EC 324 MG (65 MG FE EQUIVALENT) SCH MG: 324 (65 FE) TABLET DELAYED RESPONSE at 10:33

## 2019-02-16 RX ADMIN — FERROUS SULFATE TAB EC 324 MG (65 MG FE EQUIVALENT) SCH: 324 (65 FE) TABLET DELAYED RESPONSE at 18:45

## 2019-02-16 NOTE — PN
Teaching Attending Note


Name of Resident: Jesica Arambula





ATTENDING PHYSICIAN STATEMENT





I saw and evaluated the patient.


I reviewed the resident's note and discussed the case with the resident.


I agree with the resident's findings and plan as documented.








SUBJECTIVE: Complains of ongoing L knee and R ankle pain/swelling, inability to 

weight bear. No fever/chills. No history of trauma.








OBJECTIVE: Afebrile, Hemodynamically Stable.


 Last Vital Signs











Temp Pulse Resp BP Pulse Ox


 


 98.1 F   67   18   147/76   98 


 


 02/16/19 13:36  02/16/19 13:36  02/16/19 13:36  02/16/19 13:36  02/16/19 09:00








Heart - S1, S2, RRR


Lungs - clear to auscultation


Abdomen - High BMI. Soft, non-tender. Ventral hernia. Bowel Sounds normal.


Neuro - AAO x 3. Tone/Power normal all 4 extremities.


MS - bilateral knee and ankle swelling and tenderness.


Extremities - no calf tenderness. Boutonnier's and swan-neck deformities 

bilateral hands.





 Laboratory Results - last 24 hr











  02/16/19 02/16/19





  07:49 07:49


 


WBC  7.5 


 


RBC  3.38 L 


 


Hgb  8.1 L 


 


Hct  25.3 L 


 


MCV  74.7 L 


 


MCH  24.1 L 


 


MCHC  32.2 


 


RDW  18.0 H 


 


Plt Count  606 H 


 


MPV  7.2 L 


 


Absolute Neuts (auto)  5.0 


 


Neutrophils %  67.3 


 


Lymphocytes %  25.8 


 


Monocytes %  5.4 


 


Eosinophils %  1.0 


 


Basophils %  0.5 


 


Nucleated RBC %  0 


 


Sodium   140


 


Potassium   4.1


 


Chloride   106


 


Carbon Dioxide   26


 


Anion Gap   8


 


BUN   14


 


Creatinine   0.5 L


 


Creat Clearance w eGFR   > 60


 


Random Glucose   89


 


Calcium   8.2 L


 


Phosphorus   3.2


 


Magnesium   2.0








 Current Medications











Generic Name Dose Route Start Last Admin





  Trade Name Freq  PRN Reason Stop Dose Admin


 


Acetaminophen  650 mg  02/16/19 14:56  02/16/19 15:34





  Tylenol -  PO   650 mg





  Q6H PRN   Administration





  PAIN LEVEL 6-10   





     





     





     


 


Enoxaparin Sodium  40 mg  02/14/19 10:00  02/16/19 10:34





  Lovenox -  SQ   Not Given





  DAILY [facility]   





     





     





     





     


 


Ferrous Sulfate  325 mg  02/14/19 17:30  02/16/19 18:45





  Feosol -  PO   Not Given





  BIDWM KALEB   





     





     





     





     


 


Folic Acid  1 mg  02/15/19 10:00  02/16/19 10:34





  Folic Acid -  PO   1 mg





  DAILY KALEB   Administration





     





     





     





     


 


Methotrexate  20 mg  02/14/19 20:15  02/14/19 22:17





  Mexate -  PO   20 mg





  Th@1000 KALEB   Administration





     





     





     





     


 


Naproxen  500 mg  02/14/19 18:00  02/16/19 10:34





  Naprosyn -  PO   500 mg





  BID KALEB   Administration





     





     





     





     


 


Nicotine  14 mg  02/14/19 10:00  02/16/19 10:35





  Nicoderm Patch -  TD   14 mg





  DAILY KALEB   Administration





     





     





     





     


 


Prednisone  15 mg  02/15/19 10:00  02/16/19 10:33





  Deltasone -  PO   15 mg





  DAILY KALEB   Administration





     





     





     





     


 


Ranitidine HCl  150 mg  02/15/19 14:15  02/16/19 10:35





  Zantac -  PO   150 mg





  DAILY KALEB   Administration





     





     





     





     


 


Sulfasalazine  1,000 mg  02/14/19 10:00  02/16/19 10:33





  Azulfidine En-Tabs -  PO   1,000 mg





  BID KALEB   Administration





     





     





     





     














ASSESSMENT AND PLAN:





44 year old female with history of RA (non-compliant with MTX, Sulfasalazine, 

Prednisone), Chronic REID, presents with inability to weight bear due to knee 

and ankle pain/swelling as well as R shoulder pain. No history of trauma. 





1. Acute RA Flare - sec to non-compliance with anti-rheumatoid meds (recently 

moved to [place] and was unable to attend her physician's office for refills) 


Shoulder Xray - R glenohumeral joint arthropathy, no fracture/dislocation.


Rheumatology evaluated and recommended re-initiating home meds and slow 

tapering of oral Prednisone.


Resumed on Sulfasalazine, Naproxen, Prednisone, and once weekly Methotrexate.


PT ongoing - awaiting rehab placement





2. Chronic REID with thrombocytosis


H/H 8.1/25.3/MCV 74.7


Thrombocytosis likely sec to Iron deficiency


Last [place] 2015 - polyps as per patient.


Given 1 dose IV Venofer and placed on oral Ferrous Sulfate.


Further GI work-up as out-patient.





3. Smoker - Counseled - offered Nicotine patch.





DVT Px - Lovenox.

## 2019-02-16 NOTE — PN
Physical Exam: 


SUBJECTIVE: Patient reports she is still having some pain. She has not been 

able to walk with PT yet. No complaints overnight.








OBJECTIVE:





  Vital Signs











Temperature  98.1 F   02/16/19 13:36


 


Pulse Rate  67   02/16/19 13:36


 


Respiratory Rate  18   02/16/19 13:36


 


Blood Pressure  147/76   02/16/19 13:36


 


O2 Sat by Pulse Oximetry (%)  98   02/16/19 09:00














GENERAL: The patient is awake, alert, and fully oriented, in no acute distress.


HEAD: Normal with no signs of trauma.


EYES: PERRL, extraocular movements intact 


LUNGS: Breath sounds equal, clear to auscultation bilaterally,  


HEART: Regular rate and rhythm, S1, S2 without murmur, rub or gallop.


ABDOMEN: Soft, nontender, nondistended, normoactive bowel sounds, umbilical 

reproducible hernia


EXTREMITIES: mild edema around left ankle, RA deformities through out fingers 

and toes 


PSYCH: Normal mood, normal affect.


SKIN: Warm, dry, normal turgor, no rashes or lesions noted














  CBC, BMP





 02/16/19 07:49 





 02/16/19 07:49 











Active Medications





Acetaminophen (Tylenol -)  650 mg PO Q6H PRN


   PRN Reason: PAIN LEVEL 6-10


   Last Admin: 02/16/19 15:34 Dose:  650 mg


Enoxaparin Sodium (Lovenox -)  40 mg SQ DAILY [facility]


   Last Admin: 02/16/19 10:34 Dose:  Not Given


Ferrous Sulfate (Feosol -)  325 mg PO BIDWM [facility]


   Last Admin: 02/16/19 10:33 Dose:  325 mg


Folic Acid (Folic Acid -)  1 mg PO DAILY [facility]


   Last Admin: 02/16/19 10:34 Dose:  1 mg


Methotrexate (Mexate -)  20 mg PO Th@1000 [facility]


   Last Admin: 02/14/19 22:17 Dose:  20 mg


Naproxen (Naprosyn -)  500 mg PO BID [facility]


   Last Admin: 02/16/19 10:34 Dose:  500 mg


Nicotine (Nicoderm Patch -)  14 mg TD DAILY [facility]


   Last Admin: 02/16/19 10:35 Dose:  14 mg


Prednisone (Deltasone -)  15 mg PO DAILY [facility]


   Last Admin: 02/16/19 10:33 Dose:  15 mg


Ranitidine HCl (Zantac -)  150 mg PO DAILY [facility]


   Last Admin: 02/16/19 10:35 Dose:  150 mg


Sulfasalazine (Azulfidine En-Tabs -)  1,000 mg PO BID [facility]


   Last Admin: 02/16/19 10:33 Dose:  1,000 mg











ASSESSMENT/PLAN:


Patient is a 45 y/o with a history of RA and iron deficiency anemia who is 

admitted for a RA flare.





#RA flare


   - sulfasalazine 1,000 mg bid


   - methotrexate 20 mg th@1000


   - naproxen 500 mg po bid


   - prednisone 15 mg po daily


   - would like earlier appointment for rheumatologist; call Dr. Simon ([phone]), not open on saturday


   - tylenol as needed for pain





#iron deficency anemia


   - ferrous sulfate 325 mg po 


   - folic acid 1 mg po daily





#nicotine 


   - 14 mg td daily





FEN


   - monitor lytes





DVT ppx


   - lovenox 40 mg sq daily





Dispo: waiting for placement





Visit type





- Emergency Visit


Emergency Visit: No





- New Patient


This patient is new to me today: Yes


Date on this admission: 02/16/19





- Critical Care


Critical Care patient: No

## 2019-02-17 LAB
ALBUMIN SERPL-MCNC: 2.5 G/DL (ref 3.4–5)
ALP SERPL-CCNC: 57 U/L (ref 45–117)
ALT SERPL-CCNC: 10 U/L (ref 13–61)
ANION GAP SERPL CALC-SCNC: 7 MMOL/L (ref 8–16)
AST SERPL-CCNC: 17 U/L (ref 15–37)
BILIRUB SERPL-MCNC: < 0.1 MG/DL (ref 0.2–1)
BUN SERPL-MCNC: 12 MG/DL (ref 7–18)
CALCIUM SERPL-MCNC: 8.3 MG/DL (ref 8.5–10.1)
CHLORIDE SERPL-SCNC: 106 MMOL/L (ref 98–107)
CO2 SERPL-SCNC: 26 MMOL/L (ref 21–32)
CREAT SERPL-MCNC: 0.5 MG/DL (ref 0.55–1.3)
DEPRECATED RDW RBC AUTO: 18.2 % (ref 11.6–15.6)
GLUCOSE SERPL-MCNC: 78 MG/DL (ref 74–106)
HCT VFR BLD CALC: 25.3 % (ref 32.4–45.2)
HGB BLD-MCNC: 8.3 GM/DL (ref 10.7–15.3)
MCH RBC QN AUTO: 24.9 PG (ref 25.7–33.7)
MCHC RBC AUTO-ENTMCNC: 32.9 G/DL (ref 32–36)
MCV RBC: 75.9 FL (ref 80–96)
PLATELET # BLD AUTO: 599 K/MM3 (ref 134–434)
PMV BLD: 7.3 FL (ref 7.5–11.1)
POTASSIUM SERPLBLD-SCNC: 4.4 MMOL/L (ref 3.5–5.1)
PROT SERPL-MCNC: 7 G/DL (ref 6.4–8.2)
RBC # BLD AUTO: 3.34 M/MM3 (ref 3.6–5.2)
SODIUM SERPL-SCNC: 139 MMOL/L (ref 136–145)
WBC # BLD AUTO: 5.7 K/MM3 (ref 4–10)

## 2019-02-17 RX ADMIN — FOLIC ACID SCH MG: 1 TABLET ORAL at 10:38

## 2019-02-17 RX ADMIN — ACETAMINOPHEN PRN MG: 325 TABLET ORAL at 20:16

## 2019-02-17 RX ADMIN — FERROUS SULFATE TAB EC 324 MG (65 MG FE EQUIVALENT) SCH MG: 324 (65 FE) TABLET DELAYED RESPONSE at 16:34

## 2019-02-17 RX ADMIN — NAPROXEN SCH MG: 500 TABLET ORAL at 10:33

## 2019-02-17 RX ADMIN — PREDNISONE SCH MG: 5 TABLET ORAL at 10:37

## 2019-02-17 RX ADMIN — NICOTINE SCH MG: 14 PATCH, EXTENDED RELEASE TRANSDERMAL at 10:38

## 2019-02-17 RX ADMIN — ENOXAPARIN SODIUM SCH: 40 INJECTION SUBCUTANEOUS at 14:35

## 2019-02-17 RX ADMIN — RANITIDINE SCH MG: 150 TABLET ORAL at 10:39

## 2019-02-17 RX ADMIN — FERROUS SULFATE TAB EC 324 MG (65 MG FE EQUIVALENT) SCH MG: 324 (65 FE) TABLET DELAYED RESPONSE at 08:35

## 2019-02-17 RX ADMIN — NAPROXEN SCH MG: 500 TABLET ORAL at 21:36

## 2019-02-17 RX ADMIN — ACETAMINOPHEN PRN MG: 325 TABLET ORAL at 10:34

## 2019-02-17 NOTE — PN
Progress Note (short form)





- Note


Progress Note: 





SUBJECTIVE: Complains of ongoing L knee and R ankle pain and LE swelling, 

inability to weight bear. No fever/chills. No history of trauma.








OBJECTIVE: Afebrile, Hemodynamically Stable.





 Last Vital Signs











Temp Pulse Resp BP Pulse Ox


 


 98.3 F   77   18   130/83   98 


 


 02/17/19 18:24  02/17/19 18:24  02/17/19 18:24  02/17/19 18:24  02/17/19 01:00








 


Heart - S1, S2, RRR


Lungs - clear to auscultation


Abdomen - High BMI. Soft, non-tender. Ventral hernia. Bowel Sounds normal.


Neuro - AAO x 3. Tone/Power normal all 4 extremities.


MS - bilateral knee and ankle swelling and tenderness.


Extremities - no calf tenderness. Boutonnier's and swan-neck deformities 

bilateral hands.


 Laboratory Results - last 24 hr











  02/17/19 02/17/19





  06:20 06:20


 


WBC  5.7 


 


RBC  3.34 L 


 


Hgb  8.3 L 


 


Hct  25.3 L 


 


MCV  75.9 L 


 


MCH  24.9 L 


 


MCHC  32.9 


 


RDW  18.2 H 


 


Plt Count  599 H 


 


MPV  7.3 L 


 


Sodium   139


 


Potassium   4.4


 


Chloride   106


 


Carbon Dioxide   26


 


Anion Gap   7 L


 


BUN   12


 


Creatinine   0.5 L


 


Creat Clearance w eGFR   > 60


 


Random Glucose   78


 


Calcium   8.3 L


 


Total Bilirubin   < 0.1 L


 


AST   17


 


ALT   10 L


 


Alkaline Phosphatase   57


 


Total Protein   7.0


 


Albumin   2.5 L








 Current Medications











Generic Name Dose Route Start Last Admin





  Trade Name Freq  PRN Reason Stop Dose Admin


 


Acetaminophen  650 mg  02/16/19 14:56  02/17/19 10:34





  Tylenol -  PO   650 mg





  Q6H PRN   Administration





  PAIN LEVEL 6-10   





     





     





     


 


Enoxaparin Sodium  40 mg  02/14/19 10:00  02/17/19 14:35





  Lovenox -  SQ   Not Given





  DAILY [facility]   





     





     





     





     


 


Ferrous Sulfate  325 mg  02/14/19 17:30  02/17/19 16:34





  Feosol -  PO   325 mg





  BIDWM KALEB   Administration





     





     





     





     


 


Folic Acid  1 mg  02/15/19 10:00  02/17/19 10:38





  Folic Acid -  PO   1 mg





  DAILY KALEB   Administration





     





     





     





     


 


Methotrexate  20 mg  02/14/19 20:15  02/14/19 22:17





  Mexate -  PO   20 mg





  Th@1000 KALEB   Administration





     





     





     





     


 


Naproxen  500 mg  02/14/19 18:00  02/17/19 10:33





  Naprosyn -  PO   500 mg





  BID KALEB   Administration





     





     





     





     


 


Nicotine  14 mg  02/14/19 10:00  02/17/19 10:38





  Nicoderm Patch -  TD   14 mg





  DAILY KALEB   Administration





     





     





     





     


 


Prednisone  15 mg  02/15/19 10:00  02/17/19 10:37





  Deltasone -  PO   15 mg





  DAILY KALEB   Administration





     





     





     





     


 


Ranitidine HCl  150 mg  02/15/19 14:15  02/17/19 10:39





  Zantac -  PO   150 mg





  DAILY KALEB   Administration





     





     





     





     


 


Sulfasalazine  1,000 mg  02/14/19 10:00  02/17/19 10:39





  Azulfidine En-Tabs -  PO   1,000 mg





  BID KALEB   Administration





     





     





     





     











 








ASSESSMENT AND PLAN:





44 year old female with history of RA (non-compliant with MTX, Sulfasalazine, 

Prednisone), Chronic REID, presents with inability to weight bear due to knee 

and ankle pain/swelling as well as R shoulder pain. No history of trauma. 





1. Acute RA Flare - sec to non-compliance with anti-rheumatoid meds (recently 

moved to [place] and was unable to attend her physician's office for refills) 


Shoulder Xray - R glenohumeral joint arthropathy, no fracture/dislocation.


Rheumatology evaluated and recommended re-initiating home meds and slow 

tapering of oral Prednisone.


Resumed on Sulfasalazine, Naproxen, Prednisone, and once weekly Methotrexate.


PT ongoing - awaiting rehab placement


Patient complains of LE edema and refers that lasix has helped reduce swelling 

in the past. No history of CHF. Will give 1 dose Lasix 40mg and get Echo.





2. Chronic REID with thrombocytosis


H/H 8.3/25.3/MCV 75.9


Thrombocytosis likely sec to Iron deficiency


Last [place] 2015 - polyps as per patient.


Given 1 dose IV Venofer and placed on oral Ferrous Sulfate.


Further GI work-up as out-patient.





3. Smoker - Counseled - offered Nicotine patch.





DVT Px - Lovenox.








Visit type





- Emergency Visit


Emergency Visit: Yes


ED Registration Date: 02/15/19


Care time: The patient presented to the Emergency Department on the above date 

and was hospitalized for further evaluation of their emergent condition.





- New Patient


This patient is new to me today: No





- Critical Care


Critical Care patient: No





- Discharge Referral


Referred to [facility] No

## 2019-02-18 RX ADMIN — ACETAMINOPHEN PRN MG: 325 TABLET ORAL at 08:34

## 2019-02-18 RX ADMIN — NAPROXEN SCH MG: 500 TABLET ORAL at 09:08

## 2019-02-18 RX ADMIN — FERROUS SULFATE TAB EC 324 MG (65 MG FE EQUIVALENT) SCH MG: 324 (65 FE) TABLET DELAYED RESPONSE at 17:06

## 2019-02-18 RX ADMIN — ENOXAPARIN SODIUM SCH: 40 INJECTION SUBCUTANEOUS at 09:09

## 2019-02-18 RX ADMIN — PREDNISONE SCH MG: 5 TABLET ORAL at 09:07

## 2019-02-18 RX ADMIN — FOLIC ACID SCH MG: 1 TABLET ORAL at 09:07

## 2019-02-18 RX ADMIN — NICOTINE SCH MG: 14 PATCH, EXTENDED RELEASE TRANSDERMAL at 09:07

## 2019-02-18 RX ADMIN — NAPROXEN SCH MG: 500 TABLET ORAL at 21:04

## 2019-02-18 RX ADMIN — RANITIDINE SCH MG: 150 TABLET ORAL at 09:07

## 2019-02-18 RX ADMIN — FERROUS SULFATE TAB EC 324 MG (65 MG FE EQUIVALENT) SCH MG: 324 (65 FE) TABLET DELAYED RESPONSE at 09:07

## 2019-02-18 NOTE — PN
[address] [facility] MD Lenard, JEREMI Doll PA-C Artie Maywood, MD, MD Tamar Randolph NP Derl Arista, NP        [address] [facility], [address] [address]  22.     [phone]     FAX: [address] [place] [address], [address] [place] [address], [address] [facility] [address]     [phone]     FAX: [phone]         Patient Care Team:  Beto Hartley MD as PCP - General (Family Practice)  Lauri Gould MD (Physical Medicine and Rehab)      Problem List  Date Reviewed: 2/6/2017          Codes Class Noted    Chronic hepatitis C without hepatic coma [facility]) ICD-10-CM: B18.2  ICD-9-CM: 070.54  2/6/2017        Cirrhosis of liver without ascites ([facility] ICD-10-CM: K74.60  ICD-9-CM: 571.5  2/6/2017        Thrombocytopenia ([facility] ICD-10-CM: D69.6  ICD-9-CM: 287.5  2/6/2017        Chronic midline low back pain ICD-10-CM: M54.5, G89.29  ICD-9-CM: 724.2, 338.29  2/6/2017        Ascites due to alcoholic cirrhosis ([facility] [id number]-[id number]: K70.31  ICD-9-CM: 571.2  2/6/2017        S/P TIPS (transjugular intrahepatic portosystemic shunt) ICD-10-CM: O27.062  ICD-9-CM: V45.89  2/6/2017        COPD (chronic obstructive pulmonary disease) ([facility] ICD-10-CM: J44.9  ICD-9-CM: 035  2/6/2017                Ruthy Homans returns to the [address] for management of 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 The active problem list, all pertinent past medical history, medications, endoscopic studies, radiologic findings and laboratory findings related to the liver disorder were reviewed with the patient. The patient is a 53 y.o.  male with who was found to have cirrhosis and chronic HCV in 2012 when he developed ascites. The most recent Ultrasound of the liver was performed in 2/2017.   Results Teaching Attending Note


Name of Resident: Jatin Scanlon





ATTENDING PHYSICIAN STATEMENT





I saw and evaluated the patient.


I reviewed the resident's note and discussed the case with the resident.


I agree with the resident's findings and plan as documented.








SUBJECTIVE: Complains of ongoing L knee and R ankle pain and improving bilat LE 

swelling, inability to weight bear. No fever/chills. No history of trauma.








OBJECTIVE: Afebrile, Hemodynamically Stable.





 Last Vital Signs











Temp Pulse Resp BP Pulse Ox


 


 98.2 F   80   20   140/99   100 


 


 02/18/19 14:32  02/18/19 14:32  02/18/19 14:32  02/18/19 14:32  02/18/19 09:00











Heart - S1, S2, RRR


Lungs - clear to auscultation


Abdomen - High BMI. Soft, non-tender. Ventral hernia. Bowel Sounds normal.


Neuro - AAO x 3. Tone/Power normal all 4 extremities.


MS - bilateral knee and ankle swelling and tenderness. Swelling improved.


Extremities - no calf tenderness. Boutonnier's and swan-neck deformities 

bilateral hands.





 Current Medications











Generic Name Dose Route Start Last Admin





  Trade Name Freq  PRN Reason Stop Dose Admin


 


Acetaminophen  650 mg  02/16/19 14:56  02/18/19 08:34





  Tylenol -  PO   650 mg





  Q6H PRN   Administration





  PAIN LEVEL 6-10   





     





     





     


 


Enoxaparin Sodium  40 mg  02/14/19 10:00  02/18/19 09:09





  Lovenox -  SQ   Not Given





  DAILY KALEB   





     





     





     





     


 


Ferrous Sulfate  325 mg  02/14/19 17:30  02/18/19 09:07





  Feosol -  PO   325 mg





  BIDWM KALEB   Administration





     





     





     





     


 


Folic Acid  1 mg  02/15/19 10:00  02/18/19 09:07





  Folic Acid -  PO   1 mg





  DAILY KALEB   Administration





     





     





     





     


 


Methotrexate  20 mg  02/14/19 20:15  02/14/19 22:17





  Mexate -  PO   20 mg





  Th@1000 KALEB   Administration





     





     





     





     


 


Naproxen  500 mg  02/14/19 18:00  02/18/19 09:08





  Naprosyn -  PO   500 mg





  BID KALEB   Administration





     





     





     





     


 


Nicotine  14 mg  02/14/19 10:00  02/18/19 09:07





  Nicoderm Patch -  TD   14 mg





  DAILY KALEB   Administration





     





     





     





     


 


Prednisone  15 mg  02/15/19 10:00  02/18/19 09:07





  Deltasone -  PO   15 mg





  DAILY KALEB   Administration





     





     





     





     


 


Ranitidine HCl  150 mg  02/15/19 14:15  02/18/19 09:07





  Zantac -  PO   150 mg





  DAILY KALEB   Administration





     





     





     





     


 


Sulfasalazine  1,000 mg  02/14/19 10:00  02/18/19 09:08





  Azulfidine En-Tabs -  PO   1,000 mg





  BID KALEB   Administration





     





     





     





     











 








ASSESSMENT AND PLAN:





44 year old female with history of RA (non-compliant with MTX, Sulfasalazine, 

Prednisone), Chronic REID, presents with inability to weight bear due to knee 

and ankle pain/swelling as well as R shoulder pain. No history of trauma. 





1. Acute RA Flare - sec to non-compliance with anti-rheumatoid meds (recently 

moved to [place] and was unable to attend her physician's office for refills) 


Shoulder Xray - R glenohumeral joint arthropathy, no fracture/dislocation.


Rheumatology evaluated and recommended re-initiating home meds and slow 

tapering of oral Prednisone.


Resumed on Sulfasalazine, Naproxen, Prednisone, and once weekly Methotrexate.


PT ongoing - awaiting rehab placement


Patient complains of LE edema and refers that lasix has helped reduce swelling 

in the past. No history of CHF. Will give a second one-time dose Lasix 40mg and 

awaiting Echo. Low suspicion for CHF.





2. Chronic REID with thrombocytosis


H/H 8.3/25.3/MCV 75.9


Thrombocytosis likely sec to Iron deficiency


Last [place] 2015 - polyps as per patient.


Ferritin 32, Iron levels pending.


Given 1 dose IV Venofer and placed on oral Ferrous Sulfate.


Further GI work-up as out-patient.





3. Smoker - Counseled - offered Nicotine patch.





DVT Px - Lovenox. suggest cirrhosis. No liver mass lesions noted. TIPS is patent. Ascites has resolved with diuretics and TIPS. Edema has resolved with with diuretics and placement of TIPS. The patient has not developed hepatic encephalopathy. The patient has never received treatment for chronic HCV. The most recent laboratory studies are not available. The patient notes fatigue, diffuse abdominal pain,     The patient has not experienced problems concentrating, swelling of the abdomen, swelling of the lower extremities, hematemesis, hematochezia. The patient has limitations in functional activities which can be attributed to the liver disease. and to other medical problems that are not related to the liver disease. ALLERGIES  No Known Allergies    MEDICATIONS  Current Outpatient Prescriptions   Medication Sig    lactulose (CHRONULAC) 10 gram/15 mL solution Take 30 mL by mouth daily.  furosemide (LASIX) 40 mg tablet Take 1 Tab by mouth daily.  spironolactone (ALDACTONE) 50 mg tablet Take 2 Tabs by mouth daily.  baclofen (LIORESAL) 10 mg tablet Take 1 Tab by mouth nightly. No current facility-administered medications for this visit. SYSTEM REVIEW NOT RELATED TO LIVER DISEASE OR REVIEWED ABOVE:  Constitution systems: Negative for fever, chills, weight gain, weight loss. Eyes: Negative for visual changes. ENT: Negative for sore throat, painful swallowing. Respiratory: Negative for cough, hemoptysis, SOB. Cardiology: Negative for chest pain, palpitations. GI:  Negative for constipation or diarrhea. : Negative for urinary frequency, dysuria, hematuria, nocturia. Skin: Negative for rash. Hematology: Negative for easy bruising, blood clots. Musculo-skelatal: Severe chronic back pain for which he continues to use IV narcotics because he cannot get sufficient pain medications.   Neurologic: Negative for headaches, dizziness, vertigo, memory problems not related to HE.  Psychology: Negative for anxiety, depression. FAMILY HISTORY:  The father  of at age 80 years. The mother  of hear disease. There is no family history of liver disease. SOCIAL HISTORY:  The patient is . The patient has 2 children, and 1 grandchildren. The patient currently smokes 4-6 cigarettes daily. The patient consumes 3-4 alcoholic beverages per day. The patient is currently receiving disability. PHYSICAL EXAMINATION:  /74  Pulse 67  Temp 98.1 °F (36.7 °C) (Tympanic)   Ht 5' 10\" (1.778 m)  Wt 180 lb 2 oz (81.7 kg)  SpO2 97%  BMI 25.85 kg/m2  General: No acute distress. Eyes: Sclera anicteric. ENT: No oral lesions. Thyroid normal.  Nodes: No adenopathy. Skin: No spider angiomata. No jaundice. No palmar erythema. Respiratory: Lungs clear to auscultation. Cardiovascular: Regular heart rate. No murmurs. No JVD. Abdomen: Soft non-tender. Liver size normal to percussion/palpation. Spleen not palpable. No obvious ascites. Extremities: No edema. No muscle wasting. No gross arthritic changes. Neurologic: Alert and oriented. Cranial nerves grossly intact. No asterixis.     LABORATORY STUDIES:  [facility] Ref Rng & Units 3/31/2017 2017   WBC 3.4 - 10.8 x10E3/uL 7.1 3.9   ANC 1.4 - 7.0 x10E3/uL  1.9   HGB 12.6 - 17.7 g/dL 14.8 13.3    - 379 x10E3/uL 62 (LL) 65 (LL)   INR 0.8 - 1.2 1.3 (H) 1.3 (H)   AST 0 - 40 IU/L 105 (H) 152 (H)   ALT 0 - 44 IU/L 57 (H) 89 (H)   Alk Phos 39 - 117 IU/L 89 81   Bili, Total 0.0 - 1.2 mg/dL 4.0 (H) 2.2 (H)   Bili, Direct 0.00 - 0.40 mg/dL 1.88 (H) 1.25 (H)   Albumin 3.5 - 5.5 g/dL 3.1 (L) 3.1 (L)   BUN 6 - 24 mg/dL 10 12   Creat 0.76 - 1.27 mg/dL 0.70 (L) 0.77   Na 134 - 144 mmol/L 141 143   K 3.5 - 5.2 mmol/L 4.5 4.6   Cl 96 - 106 mmol/L 103 105   CO2 18 - 29 mmol/L 26 26   Glucose 65 - 99 mg/dL 112 (H) 89     SEROLOGIES:  [facility] Latest Ref Rng & Units 2017   Hep A Ab, Total Negative Negative   Hep B Surface Ag Negative Negative   Hep B Core Ab, Total Negative Positive (A)   Hep B Surface AB QL  Reactive   Hep C Genotype  2b   HCV RT-PCR, Quant IU/mL 79311   Ferritin 30 - 400 ng/mL 464 (H)   Iron % Saturation 15 - 55 % 43     LIVER HISTOLOGY:  Not available or performed    ENDOSCOPIC PROCEDURES:  Not available or performed    RADIOLOGY:  2/2017. Ultrasound of liver. Echogenic consistent with cirrhosis. No liver mass lesions. No dilated bile ducts. No ascites. TIPS patent. OTHER TESTING:  Not available or performed    ASSESSMENT AND PLAN:  Chronic HCV with cirrhosis. Liver function is depressed. Total bilirubin is elevated. Serum albumin is depressed. The platelet count is depressed. The patient has not previously been treated for HCV. The patient has HCV genotype 2. The patient should be treated with Epclusa (sofosbuvir and valpitasvir) and ribavirin for 12 weeks. The SVR/cure rate for is over 99%. The patient has chronic back pain. He had been taking prescription narcotics for pain control. This was stopped by his other physicians and he has had to resort to buying narcotics on the street for pain control. He is scheduled to se Dr Eliezer Johnson for pain management. There is no contraindication for him to be treated   Any narcotic pain medication, suboxone, or methadone as necessary to get him to stop using IV drugs. If he continues to use drugs he would not be a good candidate for HCV treatment as there would be a significant risk of reexposure to HCV. Ascites has resolved following TIPS placement and diuretics. Lower extremity edema persistents despite TIPs and current dose of diuretics. Hepatic encephalopathy has recently recovered since he ran out of lactulose. Will re-initiate treatment with  Lactulose. No need to restrict dietary protein at this time. The patient was counseled regarding alcohol consumption.       The patient was counseled regarding use of illicit drugs. The need for vaccination against viral hepatitis A and B will be assessed with serologic and instituted as appropriate. [address] screening will be performed. AFP was ordered today and ultrasound will be scheduled. Thrombocytopenia secondary to cirrhosis from chronic HCV. No treatment necessary. Platelet count is adequate     Anemia may be secondary to low iron stores. Will check ferritin and iron panel. If iron deficient will supplement with oral iron and evaluate for GI blood loss. All of the above issues were discussed with the patient. All questions were answered. The patient expressed a clear understanding of the above. Follow-up [facility] to initiate HCV treatment.     Prasanna Menjivar MD  Liver [place] [facility] [address] [facility] [address] [facility] [address] [place] [address] 22.  [phone]

## 2019-02-18 NOTE — PN
Progress Note (short form)





- Note


Progress Note: 





Subjective: No acute events overnight. Pt received 1 dose of 40mg PO Lasix due 

to lower extremity swelling. Pt reports she has this normally at home and will 

take Lasix PRN when her ankles become edematous. Pt moving better than usual, 

but still unable to stand.





Objective:











  02/18/19





  06:00


 


Temperature 98.8 F


 


Pulse Rate 76


 


Respiratory 20





Rate 


 


Blood Pressure 130/80








Gen: NAD, awake, sitting in bed, oriented x3


HEENT: NC/AT, CINDY, MMM


Lungs: CTA b/l no wheezes or rhonchi. On RA


Cardiac: RRR no murmurs appreciated


ABD: Soft, NT/ND, normoactive BS, large ventral hernia freely reducible with no 

overlying skin changes


Neuro: Nonfocal. Speech normal. gait not observed.


Ext: Multiple swan-neck deformities and boutonniere deformities  on hands with 

ulnar deviation b/l. Trace to 1+ lower extremity edema with bony malformations 

of feet.


MSK: Continued limited ROM of shoulders, hands and ankles b/l


PSYCH: Normal mood and affect, cooperative


Skin: Dry, warm, no rashes note.





A/P


Acute RA Flare


Iron deficiency anemia


Thrombocytosis


Tobacco use





--Improving ROM with current regiment


--Continue Methotrexate 20mg weekly (Thursdays)


   --Continue folic acid supplementation while on Methotrexate


--Continue Sulfasalazine 1g BID PO


--Continue Prednisone 15mg qDaily


--Continue Naproxen 500mg BID PO


--Rheumatology recommendations appreciated





--H/h stable currently


--Continue iron supplementation and follow-up studies on an outpatient basis





FEN:


   Fluids: None


   Electrolyte abnormalities: Lab vacation today


   Nutrition: Reglar





PPX:


   DVT - Lovenox 40mg SQ


   GI - not indicated





Dispo: awaiting placement into rehab facility; continue physical therapy here


Case discussed with Dr. Catrachito Scanlon, DO - IM PGY-2

## 2019-02-19 LAB
DEPRECATED RDW RBC AUTO: 17.9 % (ref 11.6–15.6)
HCT VFR BLD CALC: 26.7 % (ref 32.4–45.2)
HGB BLD-MCNC: 8.8 GM/DL (ref 10.7–15.3)
MCH RBC QN AUTO: 25.2 PG (ref 25.7–33.7)
MCHC RBC AUTO-ENTMCNC: 33.1 G/DL (ref 32–36)
MCV RBC: 76.2 FL (ref 80–96)
PLATELET # BLD AUTO: 591 K/MM3 (ref 134–434)
PMV BLD: 7.1 FL (ref 7.5–11.1)
RBC # BLD AUTO: 3.51 M/MM3 (ref 3.6–5.2)
WBC # BLD AUTO: 6.1 K/MM3 (ref 4–10)

## 2019-02-19 RX ADMIN — NAPROXEN SCH MG: 500 TABLET ORAL at 21:53

## 2019-02-19 RX ADMIN — ENOXAPARIN SODIUM SCH: 40 INJECTION SUBCUTANEOUS at 09:26

## 2019-02-19 RX ADMIN — FOLIC ACID SCH MG: 1 TABLET ORAL at 09:25

## 2019-02-19 RX ADMIN — RANITIDINE SCH MG: 150 TABLET ORAL at 09:25

## 2019-02-19 RX ADMIN — NICOTINE SCH MG: 14 PATCH, EXTENDED RELEASE TRANSDERMAL at 09:24

## 2019-02-19 RX ADMIN — ACETAMINOPHEN PRN MG: 325 TABLET ORAL at 09:23

## 2019-02-19 RX ADMIN — FERROUS SULFATE TAB EC 324 MG (65 MG FE EQUIVALENT) SCH MG: 324 (65 FE) TABLET DELAYED RESPONSE at 09:25

## 2019-02-19 RX ADMIN — PREDNISONE SCH MG: 5 TABLET ORAL at 09:25

## 2019-02-19 RX ADMIN — ACETAMINOPHEN PRN MG: 325 TABLET ORAL at 16:34

## 2019-02-19 RX ADMIN — NAPROXEN SCH MG: 500 TABLET ORAL at 09:25

## 2019-02-19 RX ADMIN — FERROUS SULFATE TAB EC 324 MG (65 MG FE EQUIVALENT) SCH MG: 324 (65 FE) TABLET DELAYED RESPONSE at 16:32

## 2019-02-19 NOTE — PN
Teaching Attending Note


Name of Resident: Mickey Ram





ATTENDING PHYSICIAN STATEMENT





I saw and evaluated the patient.


I reviewed the resident's note and discussed the case with the resident.


I agree with the resident's findings and plan as documented.








SUBJECTIVE:pain has improved in the feet. pedal swelling resolved. denies cp, 

SOB, fever, chills, N/V/C/D








OBJECTIVE:


 Last Vital Signs











Temp Pulse Resp BP Pulse Ox


 


 98.2 F   77   20   140/95   97 


 


 02/19/19 08:04  02/19/19 08:04  02/19/19 09:00  02/19/19 08:04  02/19/19 09:00








General NAD


Extremities no pedal edema. no bone point tenderness along the ankle. +

deformity of the feet





ASSESSMENT AND PLAN:


44 year old female with history of RA (non-compliant with MTX, Sulfasalazine, 

Prednisone), Chronic REID, presents with inability to weight bear due to knee 

and ankle pain/swelling as well as R shoulder pain. No history of trauma. 


1. Acute RA Flare - sec to non-compliance with anti-rheumatoid meds (recently 

moved to [place] and was unable to attend her physician's office for refills). 

clinically improved. re-started on home medications. steroids titrated down to 

15mg with plans to continue until can f/u with rheum in the office.  H2 blocker 

while on steroids. sulfafalazine, mtx weekly


2. pedal edema- likely due to steroids. given lasix with resolution. echo 

pending 


3. Iron def anemia with thrombocytosis- s/p venofer. on iron supplementation. 

will need repeat iron studies in 3 motnhs. 


4. continious nicotine dependence- nicotine patch


5. Medically optimized for discharge at this time. awaiting bed availability 

for [facility].

## 2019-02-19 NOTE — PN
Physical Exam: 


SUBJECTIVE: Patient seen and examined at bedside. Rheumatologic complaints (pain

, stiffness, weakness, fatigue) improved. On treatment. States Lasix given 

yesterday improved LE swelling.








OBJECTIVE:





 Vital Signs











 Period  Temp  Pulse  Resp  BP Sys/Tesfaye  Pulse Ox


 


 Last 24 Hr  98 F-98.8 F  73-93  20-20  117-150/73-99  











GENERAL: A&Ox3, NAD


HEENT: NC/AT, PERRLA, EOMI, MMM


NECK: Normal range of motion, supple, no LAD, no JVD


LUNGS: CTA b/l


HEART: RRR no m/r/g


ABDOMEN: +bs, soft, obese, NT, ND, prominent reducible ventral hernia


MUSCULOSKELETAL: [place] neck and boutonniere deformities of hand b/l, decreased 

ROM of shoulders and ankles b/l


EXT: 2+ pulses, wwp, no edema appreciated, pedal swelling appears rheumatologic 

in character


NEUROLOGICAL: CNs, motor, sensory systems w/o focal deficit, generalized lower 

ext weakness b/l


PSYCHIATRIC: normal mood, normal affect


SKIN: warm, dry, normal turgor

















 Laboratory Results - last 24 hr











  02/19/19





  06:20


 


WBC  6.1


 


RBC  3.51 L


 


Hgb  8.8 L


 


Hct  26.7 L


 


MCV  76.2 L


 


MCH  25.2 L


 


MCHC  33.1


 


RDW  17.9 H


 


Plt Count  591 H


 


MPV  7.1 L








Active Medications











Generic Name Dose Route Start Last Admin





  Trade Name Freq  PRN Reason Stop Dose Admin


 


Acetaminophen  650 mg  02/16/19 14:56  02/18/19 08:34





  Tylenol -  PO   650 mg





  Q6H PRN   Administration





  PAIN LEVEL 6-10   





     





     





     


 


Enoxaparin Sodium  40 mg  02/14/19 10:00  02/18/19 09:09





  Lovenox -  SQ   Not Given





  DAILY KALEB   





     





     





     





     


 


Ferrous Sulfate  325 mg  02/14/19 17:30  02/18/19 17:06





  Feosol -  PO   325 mg





  BIDWM KALEB   Administration





     





     





     





     


 


Folic Acid  1 mg  02/15/19 10:00  02/18/19 09:07





  Folic Acid -  PO   1 mg





  DAILY KALEB   Administration





     





     





     





     


 


Methotrexate  20 mg  02/14/19 20:15  02/14/19 22:17





  Mexate -  PO   20 mg





  Th@1000 KALEB   Administration





     





     





     





     


 


Naproxen  500 mg  02/14/19 18:00  02/18/19 21:04





  Naprosyn -  PO   500 mg





  BID KALEB   Administration





     





     





     





     


 


Nicotine  14 mg  02/14/19 10:00  02/18/19 09:07





  Nicoderm Patch -  TD   14 mg





  DAILY KALEB   Administration





     





     





     





     


 


Prednisone  15 mg  02/15/19 10:00  02/18/19 09:07





  Deltasone -  PO   15 mg





  DAILY KALEB   Administration





     





     





     





     


 


Ranitidine HCl  150 mg  02/15/19 14:15  02/18/19 09:07





  Zantac -  PO   150 mg





  DAILY KALEB   Administration





     





     





     





     


 


Sulfasalazine  1,000 mg  02/14/19 10:00  02/18/19 21:04





  Azulfidine En-Tabs -  PO   1,000 mg





  BID KALEB   Administration





     





     





     





     











ASSESSMENT/PLAN:


45 y/o F w/ PMHx RA and iron def anemia p/w progressive weakness and inability 

to ambulate after fall, admitted for obs for RA flare.





#RA flare


-Pt relocated from [place] to [place] several months ago and has not been able 

to re-establish care, has been of DMARDs


-Rheumatology consulted, Dr. Og following


-imaging negative


-cont sulfasalazine, prednisone, MTX, Naproxen





#Iron deficiency anemia


-Fe Sulfate 325 mg bid


-Follow H/H, no active signs of bleeding





#Thrombocytosis


-likely secondary to iron deficiency


-continue to monitor CBC





#foot swelling


-Pt states improved on Lasix yesterday


-echo pending





#Nicotine dependance


-14 mg TD nicotine patch





#asymptomatic bacteruria


-mildly positive UA: 2+ LE, 33 WBC, negative nitrites


-no dysuric symptoms


-no Tx at this time, recommend outpt f/u





#PPx


-DVT: Lovenox


-GI: Zantac





#FEN


-No IVF


-monitor and correct electrolytes


-regular diet





#code


-full





#dispo


-cont to monitor on m/s 


-awaiting SNF placement








Visit type





- Emergency Visit


Emergency Visit: No





- New Patient


This patient is new to me today: No





- Critical Care


Critical Care patient: No

## 2019-02-20 LAB
DEPRECATED RDW RBC AUTO: 18.5 % (ref 11.6–15.6)
HCT VFR BLD CALC: 29.2 % (ref 32.4–45.2)
HGB BLD-MCNC: 9.4 GM/DL (ref 10.7–15.3)
MCH RBC QN AUTO: 24.6 PG (ref 25.7–33.7)
MCHC RBC AUTO-ENTMCNC: 32.3 G/DL (ref 32–36)
MCV RBC: 76 FL (ref 80–96)
PLATELET # BLD AUTO: 591 K/MM3 (ref 134–434)
PMV BLD: 7.3 FL (ref 7.5–11.1)
RBC # BLD AUTO: 3.84 M/MM3 (ref 3.6–5.2)
WBC # BLD AUTO: 8.5 K/MM3 (ref 4–10)

## 2019-02-20 RX ADMIN — ACETAMINOPHEN PRN MG: 325 TABLET ORAL at 03:37

## 2019-02-20 RX ADMIN — RANITIDINE SCH MG: 150 TABLET ORAL at 10:04

## 2019-02-20 RX ADMIN — NAPROXEN SCH MG: 500 TABLET ORAL at 10:03

## 2019-02-20 RX ADMIN — ACETAMINOPHEN PRN MG: 325 TABLET ORAL at 12:35

## 2019-02-20 RX ADMIN — ACETAMINOPHEN PRN MG: 325 TABLET ORAL at 16:07

## 2019-02-20 RX ADMIN — FERROUS SULFATE TAB EC 324 MG (65 MG FE EQUIVALENT) SCH MG: 324 (65 FE) TABLET DELAYED RESPONSE at 17:06

## 2019-02-20 RX ADMIN — BENZOCAINE PRN APPLIC: 200 GEL TOPICAL at 19:30

## 2019-02-20 RX ADMIN — NICOTINE SCH MG: 14 PATCH, EXTENDED RELEASE TRANSDERMAL at 10:02

## 2019-02-20 RX ADMIN — FERROUS SULFATE TAB EC 324 MG (65 MG FE EQUIVALENT) SCH MG: 324 (65 FE) TABLET DELAYED RESPONSE at 08:39

## 2019-02-20 RX ADMIN — ENOXAPARIN SODIUM SCH MG: 40 INJECTION SUBCUTANEOUS at 10:03

## 2019-02-20 RX ADMIN — FOLIC ACID SCH MG: 1 TABLET ORAL at 10:03

## 2019-02-20 RX ADMIN — NAPROXEN SCH MG: 500 TABLET ORAL at 20:59

## 2019-02-20 RX ADMIN — PREDNISONE SCH MG: 5 TABLET ORAL at 10:02

## 2019-02-20 RX ADMIN — ENOXAPARIN SODIUM SCH: 40 INJECTION SUBCUTANEOUS at 10:10

## 2019-02-20 NOTE — PN
Physical Exam: 


SUBJECTIVE: Patient seen and examined at bedside. Rheumatologic complaints (pain

, stiffness, weakness, fatigue) improved. On treatment. No further complaints 

of LE swelling. Newly complains of toothache with onset overnight. 








OBJECTIVE:





 Vital Signs











 Period  Temp  Pulse  Resp  BP Sys/Tesfaye  Pulse Ox


 


 Last 24 Hr  97.8 F-98.3 F  81-92  20-20  126-142/81-87  97-97











GENERAL: A&Ox3, NAD


HEENT: NC/AT, PERRLA, EOMI, MMM, broken left lower premolar with associated 

swelling, no drainage of infectious appearance.


NECK: Normal range of motion, supple, no LAD, no JVD


LUNGS: CTA b/l


HEART: RRR no m/r/g


ABDOMEN: +bs, soft, obese, NT, ND, prominent reducible ventral hernia


MUSCULOSKELETAL: [place] neck and boutonniere deformities of hand b/l, decreased 

ROM of shoulders and ankles b/l


EXT: 2+ pulses, wwp, no edema appreciated, pedal swelling appears rheumatologic 

in character


NEUROLOGICAL: CNs, motor, sensory systems w/o focal deficit, generalized lower 

ext weakness b/l


PSYCHIATRIC: normal mood, normal affect


SKIN: warm, dry, normal turgor











 Laboratory Results - last 24 hr











  02/14/19





  05:18


 


Iron  47








Active Medications











Generic Name Dose Route Start Last Admin





  Trade Name Freq  PRN Reason Stop Dose Admin


 


Acetaminophen  650 mg  02/16/19 14:56  02/20/19 03:37





  Tylenol -  PO   650 mg





  Q6H PRN   Administration





  PAIN LEVEL 6-10   





     





     





     


 


Enoxaparin Sodium  40 mg  02/14/19 10:00  02/19/19 09:26





  Lovenox -  SQ   Not Given





  [facility]ILY [facility]   





     





     





     





     


 


Ferrous Sulfate  325 mg  02/14/19 17:30  02/19/19 16:32





  Feosol -  PO   325 mg





  BIDWM KALEB   Administration





     





     





     





     


 


Folic Acid  1 mg  02/15/19 10:00  02/19/19 09:25





  Folic Acid -  PO   1 mg





  DAILY KALEB   Administration





     





     





     





     


 


Methotrexate  20 mg  02/14/19 20:15  02/14/19 22:17





  Mexate -  PO   20 mg





  Th@1000 KALEB   Administration





     





     





     





     


 


Naproxen  500 mg  02/14/19 18:00  02/19/19 21:53





  Naprosyn -  PO   500 mg





  BID KALEB   Administration





     





     





     





     


 


Nicotine  14 mg  02/14/19 10:00  02/19/19 09:24





  Nicoderm Patch -  TD   14 mg





  DAILY KALEB   Administration





     





     





     





     


 


Prednisone  15 mg  02/15/19 10:00  02/19/19 09:25





  Deltasone -  PO   15 mg





  DAILY KALEB   Administration





     





     





     





     


 


Ranitidine HCl  150 mg  02/15/19 14:15  02/19/19 09:25





  Zantac -  PO   150 mg





  DAILY KALEB   Administration





     





     





     





     


 


Senna/Docusate Sodium  2 tablet  02/19/19 22:00  02/19/19 21:53





  Pericolace -  PO  02/20/19 15:05  2 tablet





  HS KALEB   Administration





     





     





     





     


 


Sulfasalazine  1,000 mg  02/14/19 10:00  02/19/19 21:53





  Azulfidine En-Tabs -  PO   1,000 mg





  BID KALEB   Administration





     





     





     





     











ASSESSMENT/PLAN:


45 y/o F w/ PMHx RA and iron def anemia p/w progressive weakness and inability 

to ambulate after fall, admitted for obs for RA flare.





#RA flare


-Pt relocated from [place] to [place] several months ago and has not been able 

to re-establish care, has been of DMARDs


-Rheumatology consulted, Dr. Og following


-imaging negative


-cont sulfasalazine, prednisone, MTX, Naproxen





#Iron deficiency anemia


-Fe Sulfate 325 mg bid


-no further inpt labs unless clinical condition changes





#Thrombocytosis


-likely secondary to iron deficiency


-continue to monitor CBC





#foot swelling


-no further complaints


-echo performed, read pending





#dental lesion


-no drainage or infectious appearance


-one time tramadol, cont tylenol PRN


-will need outpt dental f/u





#Nicotine dependance


-14 mg TD nicotine patch





#asymptomatic bacteruria


-mildly positive UA: 2+ LE, 33 WBC, negative nitrites


-no dysuric symptoms


-no Tx at this time, recommend outpt f/u





#PPx


-DVT: Lovenox


-GI: Zantac





#FEN


-No IVF


-monitor and correct electrolytes


-regular diet





#code


-full





#dispo


-awaiting SNF placement


-cont to monitor on m/s if placement is not achieved








Visit type





- Emergency Visit


Emergency Visit: No





- New Patient


This patient is new to me today: No





- Critical Care


Critical Care patient: No

## 2019-02-20 NOTE — PN
Teaching Attending Note


Name of Resident: Mickey Ram





ATTENDING PHYSICIAN STATEMENT





I saw and evaluated the patient.


I reviewed the resident's note and discussed the case with the resident.


I agree with the resident's findings and plan as documented.








SUBJECTIVE: c/o tooth pain from a cracked tooth shes had for some time. denies 

Cp, SOB, fever, chills, n/V/C/D








OBJECTIVE:





 Last Vital Signs











Temp Pulse Resp BP Pulse Ox


 


 98.6 F   93 H  20   130/84   99 


 


 02/20/19 10:00  02/20/19 10:00  02/20/19 10:00 02/20/19 10:00 02/20/19 09:00








General NAD


HEENT L lower jaw premolar with cracked tooth, no pus or bloody discharge. no 

swelling no facial swelling or erythema





ASSESSMENT AND PLAN:


44 year old female with history of RA (non-compliant with MTX, Sulfasalazine, 

Prednisone), Chronic REID, presents with inability to weight bear due to knee 

and ankle pain/swelling as well as R shoulder pain. No history of trauma. 


1. Acute RA Flare - sec to non-compliance with anti-rheumatoid meds (recently 

moved to [place] and was unable to attend her physician's office for refills). 

clinically improved. re-started on home medications. steroids titrated down to 

15mg with plans to continue until can f/u with rheum in the office.  H2 blocker 

while on steroids. sulfafalazine, mtx weekly


2. pedal edema- likely due to steroids. given lasix with resolution


3. Iron def anemia with thrombocytosis- s/p venofer. on iron supplementation. 

will need repeat iron studies in 3 months. 


4. continuous nicotine dependence- nicotine patch


5. tooth pain- likely due to cracked tooth. does not appear infected. 

encouraged to f/u with dentist for further workup


6. Medically optimized for discharge at this time. awaiting bed availability 

for [facility].

## 2019-02-21 VITALS — TEMPERATURE: 98.1 F

## 2019-02-21 RX ADMIN — PREDNISONE SCH MG: 5 TABLET ORAL at 09:38

## 2019-02-21 RX ADMIN — NICOTINE SCH MG: 14 PATCH, EXTENDED RELEASE TRANSDERMAL at 09:38

## 2019-02-21 RX ADMIN — FOLIC ACID SCH MG: 1 TABLET ORAL at 09:38

## 2019-02-21 RX ADMIN — BENZOCAINE PRN APPLIC: 200 GEL TOPICAL at 06:51

## 2019-02-21 RX ADMIN — FERROUS SULFATE TAB EC 324 MG (65 MG FE EQUIVALENT) SCH MG: 324 (65 FE) TABLET DELAYED RESPONSE at 08:36

## 2019-02-21 RX ADMIN — ACETAMINOPHEN PRN MG: 325 TABLET ORAL at 06:29

## 2019-02-21 RX ADMIN — NAPROXEN SCH MG: 500 TABLET ORAL at 21:14

## 2019-02-21 RX ADMIN — RANITIDINE SCH MG: 150 TABLET ORAL at 09:38

## 2019-02-21 RX ADMIN — NAPROXEN SCH MG: 500 TABLET ORAL at 09:38

## 2019-02-21 RX ADMIN — FERROUS SULFATE TAB EC 324 MG (65 MG FE EQUIVALENT) SCH MG: 324 (65 FE) TABLET DELAYED RESPONSE at 16:46

## 2019-02-21 RX ADMIN — ACETAMINOPHEN PRN MG: 325 TABLET ORAL at 11:51

## 2019-02-21 RX ADMIN — ACETAMINOPHEN PRN MG: 325 TABLET ORAL at 19:10

## 2019-02-21 RX ADMIN — ACETAMINOPHEN PRN MG: 325 TABLET ORAL at 00:16

## 2019-02-21 RX ADMIN — METHOTREXATE SCH MG: 2.5 TABLET ORAL at 09:39

## 2019-02-21 NOTE — DS
Physical Exam: 


SUBJECTIVE: Patient seen and examined at bedside. Rheumatologic complaints (pain

, stiffness, weakness, fatigue) improved. On treatment. No further complaints 

of LE swelling. C/o worsening pain and swelling of broken left lower pre-molar.











OBJECTIVE:





 Vital Signs











 Period  Temp  Pulse  Resp  BP Sys/Tesfaye  Pulse Ox


 


 Last 24 Hr  97.9 F-98.5 F    18-22  126-152/87-93  99-99








PHYSICAL EXAM





GENERAL: A&Ox3, NAD


HEENT: NC/AT, PERRLA, EOMI, MMM, broken left lower premolar with worsening 

associated swelling and tenderness, may be significant of underlying collection


NECK: Normal range of motion, supple, no LAD, no JVD


LUNGS: CTA b/l


HEART: RRR no m/r/g


ABDOMEN: +bs, soft, obese, NT, ND, prominent reducible ventral hernia


MUSCULOSKELETAL: [place] neck and boutonniere deformities of hand b/l, decreased 

ROM of shoulders and ankles b/l


EXT: 2+ pulses, wwp, no edema appreciated, pedal swelling appears rheumatologic 

in character


NEUROLOGICAL: CNs, motor, sensory systems w/o focal deficit, generalized lower 

ext weakness b/l


PSYCHIATRIC: normal mood, normal affect


SKIN: warm, dry, normal turgor





LABS





HOSPITAL COURSE:





Date of Admission:02/15/19





Patient is a 43 y/o F w/ PMHx RA and iron def anemia who presented with 

progressive weakness, stiffness, and pain secondary to several months' 

cessation of DMARDs and resulted in an inability to ambulate. Imaging and labs 

failed to demonstrate any acute pathologic process. Rheumatology was consulted. 

She was started on a regimen of sulfasalazine, prednisone, MTX, Naproxen and 

improved, but required discharge to SNF for further rehabilitation. She was 

referred to outpatient rheumatology and primary medical care and given 

instructions for close followup. She was additionally treated with supplemental 

iron for her anemia and blood counts were stable throughout hospitalization. 

Her hospitalization was complicated by development of peridontal/gingival pain 

and swelling at the site of a broken left lower pre-molar. She was prescribed 

antibiotics in-hospital and upon discharge and given instructions for dental 

followup.





Date of Discharge: 02/21/19











Minutes to complete discharge: 40





Discharge Summary


Reason For Visit: RHEUMATOID ARTHRITIS,RIGHT SHOULDER PAIN,RIGHT


Current Active Problems





Ankle pain, right (Acute)


Shoulder pain, right (Acute)


Unable to bear weight (Acute)








Condition: Stable





- Instructions


Diet, Activity, Other Instructions: 


You were evaluated for a flareup of rheumatoid arthritis. You were seen by a 

rheumatologist, Dr. Og, and a regimen of medications for control of the 

disease was implemented. You have no further active issues that require 

inpatient hospitalization. You will require close followup and management with 

a rheumatologist. You are being discharged to a skilled nursing facility for 

physical rehabilitation. You will be provided with the medications you received 

in the hospital upon your discharge; these are listed below. Long-term 

management will be directed by your rheumatologist. You have been referred to 

Dr. Og; please see him or another rheumatologist of your choice within one 

week of discharge. You are also being referred to our internal medicine clinic 

for outpatient primary medical care. Please make an appointment and follow up 

within one week of discharge. Additionally, you require follow up with a 

dentist for the lesion of your left lower tooth. Please make an appointment 

with a dentist as soon as possible. If you experience any worsening pain, falls

, weakness, chest pain, shortness of breath, loss of consciousness, or any 

other new or concerning symptoms, please return to the Emergency Department.





Medication instructions for SNF staff:


Methotrexate 20mg PO weekly on Thursdays


Naproxen 500mg PO BID


Prednisone 15mg PO daily


Folic acid 1mg PO daily


Sulfasalazine 1000mg PO BID


Ferrous sulfate 325mg PO BIDWM


Continue Clindamycin 300mg PO q8h for another 7 days 





Follow-up:


please follow-up with a dentist in one week 


Referrals: 


[facility] [place] [Provider Group]


Alessandro Og MD [Staff Physician] - 


Disposition: SKILLED NURSING FACILITY





- Home Medications


Comprehensive Discharge Medication List: 


Ambulatory Orders





Ferrous Sulfate [Feosol] 325 mg PO BIDWM  ud 02/15/19 


Folic Acid - 1 mg PO DAILY  tablet 02/15/19 


Nicotine Patch [Nicoderm Patch -] 14 mg TD DAILY  patch 02/15/19 


predniSONE [Deltasone -] 15 mg PO DAILY  tablet 02/15/19 


Acetaminophen [Tylenol .Regular Strength -] 650 mg PO Q6H PRN  tablet 02/19/19 


Methotrexate [Mexate -] 20 mg PO Th@1000  tablet 02/19/19 


Naproxen [Naprosyn -] 500 mg PO BID  tablet 02/19/19 


Ranitidine [Zantac -] 150 mg PO DAILY  tablet 02/19/19 


Sulfasalazine [Azulfidine En-Tabs -] 1,000 mg PO BID  tablet. 02/19/19 


Clindamycin [Cleocin -] 300 mg PO Q8H #20 capsule 02/21/19 








This patient is new to me today: No


Emergency Visit: No


Critical Care patient: No





- Discharge Referral


Referred to [facility] No

## 2019-02-21 NOTE — PN
Physical Exam: 


SUBJECTIVE: Patient seen and examined at bedside. Rheumatologic complaints (pain

, stiffness, weakness, fatigue) improved. On treatment. No further complaints 

of LE swelling. C/o worsening pain and swelling of broken left lower pre-molar.








OBJECTIVE:





 Vital Signs











 Period  Temp  Pulse  Resp  BP Sys/Tesfaye  Pulse Ox


 


 Last 24 Hr  97.9 F-98.6 F  81-93  20-22  130-152/78-93  99-99











GENERAL: A&Ox3, NAD


HEENT: NC/AT, PERRLA, EOMI, MMM, broken left lower premolar with worsening 

associated swelling and tenderness, may be significant of underlying collection


NECK: Normal range of motion, supple, no LAD, no JVD


LUNGS: CTA b/l


HEART: RRR no m/r/g


ABDOMEN: +bs, soft, obese, NT, ND, prominent reducible ventral hernia


MUSCULOSKELETAL: [place] neck and boutonniere deformities of hand b/l, decreased 

ROM of shoulders and ankles b/l


EXT: 2+ pulses, wwp, no edema appreciated, pedal swelling appears rheumatologic 

in character


NEUROLOGICAL: CNs, motor, sensory systems w/o focal deficit, generalized lower 

ext weakness b/l


PSYCHIATRIC: normal mood, normal affect


SKIN: warm, dry, normal turgor














 Laboratory Results - last 24 hr











  02/14/19 02/20/19





  05:18 08:00


 


WBC   8.5


 


RBC   3.84


 


Hgb   9.4 L


 


Hct   29.2 L


 


MCV   76.0 L


 


MCH   24.6 L


 


MCHC   32.3


 


RDW   18.5 H


 


Plt Count   591 H


 


MPV   7.3 L


 


Transferrin  184 L 








Active Medications











Generic Name Dose Route Start Last Admin





  Trade Name Freq  PRN Reason Stop Dose Admin


 


Acetaminophen  650 mg  02/20/19 15:45  02/21/19 06:29





  Tylenol -  PO   650 mg





  Q4H PRN   Administration





  PAIN LEVEL 6-10   





     





     





     


 


Benzocaine  1 applic  02/20/19 16:13  02/21/19 06:51





  Anbesol -  MM   1 applic





  Q6H PRN   Administration





  ORAL PAIN/[place]ORES   





     





     





     


 


Enoxaparin Sodium  40 mg  02/14/19 10:00  02/20/19 10:10





  Lovenox -  SQ   Not Given





  DAILY KALEB   





     





     





     





     


 


Ferrous Sulfate  325 mg  02/14/19 17:30  02/20/19 17:06





  Feosol -  PO   325 mg





  BIDWM KALEB   Administration





     





     





     





     


 


Folic Acid  1 mg  02/15/19 10:00  02/20/19 10:03





  Folic Acid -  PO   1 mg





  DAILY KALEB   Administration





     





     





     





     


 


Methotrexate  20 mg  02/14/19 20:15  02/14/19 22:17





  Mexate -  PO   20 mg





  Th@1000 KALEB   Administration





     





     





     





     


 


Naproxen  500 mg  02/14/19 18:00  02/20/19 20:59





  Naprosyn -  PO   500 mg





  BID KALEB   Administration





     





     





     





     


 


Nicotine  14 mg  02/14/19 10:00  02/20/19 10:02





  Nicoderm Patch -  TD   14 mg





  DAILY KALEB   Administration





     





     





     





     


 


Prednisone  15 mg  02/15/19 10:00  02/20/19 10:02





  Deltasone -  PO   15 mg





  DAILY KALEB   Administration





     





     





     





     


 


Ranitidine HCl  150 mg  02/15/19 14:15  02/20/19 10:04





  Zantac -  PO   150 mg





  DAILY KALEB   Administration





     





     





     





     


 


Sulfasalazine  1,000 mg  02/14/19 10:00  02/20/19 20:59





  Azulfidine En-Tabs -  PO   1,000 mg





  BID KALEB   Administration





     





     





     





     











ASSESSMENT/PLAN:


43 y/o F w/ PMHx RA and iron def anemia p/w progressive weakness and inability 

to ambulate after fall, admitted for obs for RA flare.





#RA flare


-Pt relocated from [place] to [place] several months ago and has not been able 

to re-establish care, has been of DMARDs


-Rheumatology consulted, Dr. Og following


-imaging negative


-cont sulfasalazine, prednisone, MTX, Naproxen





#Iron deficiency anemia


-Fe Sulfate 325 mg bid


-no further inpt labs unless clinical condition changes





#Thrombocytosis


-likely secondary to iron deficiency


-continue to monitor CBC





#foot swelling


-no further complaints


-echo performed, read pending





#dental lesion


-swelling worsening


-empiric clindamycin


-will need dental f/u





#Nicotine dependance


-14 mg TD nicotine patch





#asymptomatic bacteruria


-mildly positive UA: 2+ LE, 33 WBC, negative nitrites


-no dysuric symptoms


-no Tx at this time, recommend outpt f/u





#PPx


-DVT: Lovenox


-GI: Zantac





#FEN


-No IVF


-monitor and correct electrolytes


-regular diet





#code


-full





#dispo


-awaiting SNF placement


-cont to monitor on m/s if placement is not achieved

## 2019-02-21 NOTE — PN
Teaching Attending Note


Name of Resident: Mickey Ram





ATTENDING PHYSICIAN STATEMENT





I saw and evaluated the patient.


I reviewed the resident's note and discussed the case with the resident.


I agree with the resident's findings and plan as documented.








SUBJECTIVE:c/o increasing pain in the mouth near the cracked tooth. states she 

spit out "yellow pus" and had metallic taste in her mouth. denies Cp, SOB, fever

, chills








OBJECTIVE:


 Last Vital Signs











Temp Pulse Resp BP Pulse Ox


 


 98.5 F   91 H  20   126/87   99 


 


 02/21/19 10:00  02/21/19 10:00  02/21/19 10:00  02/21/19 10:00 02/21/19 09:00











General NAD


HEENT L lower jaw premolar with cracked tooth, swelling below the tooth with 

active drainage. very tender. 





ASSESSMENT AND PLAN:


44 year old female with history of RA (non-compliant with MTX, Sulfasalazine, 

Prednisone), Chronic REID, presents with inability to weight bear due to knee 

and ankle pain/swelling as well as R shoulder pain. No history of trauma. 


1. Acute RA Flare - sec to non-compliance with anti-rheumatoid meds (recently 

moved to [place] and was unable to attend her physician's office for refills). 

clinically improved. re-started on home medications. steroids titrated down to 

15mg with plans to continue until can f/u with rheum in the office.  H2 blocker 

while on steroids. sulfafalazine, mtx weekly


2. pedal edema- likely due to steroids. given lasix with resolution


3. Iron def anemia with thrombocytosis- s/p venofer. on iron supplementation. 

will need repeat iron studies in 3 months. 


4. continuous nicotine dependence- nicotine patch


5. tooth pain- likely due to cracked tooth. possible developing infection. will 

d/c on clindamycin x7 days. instructions to see dentist tomorrow or Monday the 

latest for full evaluation.  


6. Medically optimized for discharge at this time. awaiting ins. auth for LINK.

## 2019-02-21 NOTE — ECHO
Version:  1



Name:  MARYCRUZ BASS                                    Exam: Adult Echocardiogram

MRN:  [id number]                                       Study Date:  2019, 7:42 AM

Age:  44 Years



 ____________________________________________________________________________________________________
__________



MMode/2D Measurements & Calculations





IVSd: 1.26 cm                                          LVIDs: 2.9 cm

LVIDd: 4.7 cm

LVPWd: 0.96 cm

LAV (MOD-bp):  56.3 ml

                                                       Ao root diam: 3.4 cm

                                                       LA dimension: 3.6 cm



Doppler Measurements & Calculations



MV E max ranjit: 63.1 cm/sec                              Med E/e':  8.8

MV A max ranjit: 62.6 cm/sec                              Med Peak E' Ranjit:  7.2 cm/sec

MV E/A: 1.01

Lat E/e':  4.8

Lat Peak E' Ranjit:  13.2 cm/sec

MR max P.4 mmHg



 Left Ventricle

 The left ventricle is normal in size. There is mild concentric left ventricular hypertrophy. The lef
t

 ventricular ejection fraction is normal. Ejection Fraction = 60%. E/A reversal consistent with but n
ot

 diagnostic of poor LV compliance. The left ventricular wall motion is normal.

 Mitral Valve

 There is mild mitral valve thickening. There is mild to moderate mitral regurgitation.

 Aortic Valve

 There is trivial aortic valve thickening.

 Pulmonic Valve

 Mild pulmonic valvular regurgitation.

 Great Vessels

 The aortic root is normal size.

 Pericardium/Pleura

 There is no pericardial effusion.







 Summary Statements

 The left ventricle is normal in size.

 The left ventricular ejection fraction is normal.

 Ejection Fraction = 60%.

 E/A reversal consistent with but not diagnostic of poor LV compliance

 The left ventricular wall motion is normal.

 There is mild mitral valve thickening.

 There is mild to moderate mitral regurgitation.

 There is mild concentric left ventricular hypertrophy.

 Mild pulmonic valvular regurgitation.

 There is trivial aortic valve thickening.

 The aortic root is normal size.

 There is no pericardial effusion.





            Spencer Dick MD                  2019, 12:25 AM

 Ordering Physician:  Jeramie Winston

 Performed By:  Karla Gray

## 2019-02-22 VITALS — SYSTOLIC BLOOD PRESSURE: 150 MMHG | DIASTOLIC BLOOD PRESSURE: 82 MMHG | HEART RATE: 80 BPM

## 2019-02-22 RX ADMIN — NAPROXEN SCH MG: 500 TABLET ORAL at 09:54

## 2019-02-22 RX ADMIN — FOLIC ACID SCH MG: 1 TABLET ORAL at 09:54

## 2019-02-22 RX ADMIN — ACETAMINOPHEN PRN MG: 325 TABLET ORAL at 05:27

## 2019-02-22 RX ADMIN — PREDNISONE SCH MG: 5 TABLET ORAL at 09:54

## 2019-02-22 RX ADMIN — CLINDAMYCIN HYDROCHLORIDE SCH MG: 150 CAPSULE ORAL at 09:53

## 2019-02-22 RX ADMIN — NICOTINE SCH MG: 14 PATCH, EXTENDED RELEASE TRANSDERMAL at 09:54

## 2019-02-22 RX ADMIN — CLINDAMYCIN HYDROCHLORIDE SCH MG: 150 CAPSULE ORAL at 14:26

## 2019-02-22 RX ADMIN — FERROUS SULFATE TAB EC 324 MG (65 MG FE EQUIVALENT) SCH MG: 324 (65 FE) TABLET DELAYED RESPONSE at 17:37

## 2019-02-22 RX ADMIN — FERROUS SULFATE TAB EC 324 MG (65 MG FE EQUIVALENT) SCH MG: 324 (65 FE) TABLET DELAYED RESPONSE at 09:54

## 2019-02-22 RX ADMIN — RANITIDINE SCH MG: 150 TABLET ORAL at 09:54

## 2019-06-24 ENCOUNTER — HOSPITAL ENCOUNTER (EMERGENCY)
Dept: HOSPITAL 74 - JERFT | Age: 44
Discharge: HOME | End: 2019-06-24
Payer: COMMERCIAL

## 2019-06-24 VITALS — DIASTOLIC BLOOD PRESSURE: 99 MMHG | TEMPERATURE: 98.6 F | HEART RATE: 96 BPM | SYSTOLIC BLOOD PRESSURE: 147 MMHG

## 2019-06-24 VITALS — BODY MASS INDEX: 36.9 KG/M2

## 2019-06-24 DIAGNOSIS — M06.842: ICD-10-CM

## 2019-06-24 DIAGNOSIS — M06.841: ICD-10-CM

## 2019-06-24 DIAGNOSIS — M25.571: Primary | ICD-10-CM

## 2019-06-24 DIAGNOSIS — G89.29: ICD-10-CM

## 2019-06-24 NOTE — PDOC
Rapid Medical Evaluation


Time Seen by Provider: 06/24/19 15:54


Medical Evaluation: 


 Allergies











Allergy/AdvReac Type Severity Reaction Status Date / Time


 


No Known Allergies Allergy   Verified 02/14/19 01:54











06/24/19 15:54


I have performed a brief in-person evaluation of this patient.





The patient presents with a chief complaint of: right ankle pain s/p fall





Pertinent physical exam findings: FAROM of right foot. +2 DP. swelling to right 

ankle. TTP over medial malleolus. No deformity, crepitus present





I have ordered the following: ice, xray





The patient will proceed to the ED for further evaluation.





**Discharge Disposition





- Diagnosis


 Ankle pain, right








- Referrals





- Patient Instructions





- Post Discharge Activity

## 2019-06-24 NOTE — PDOC
History of Present Illness





- General


Chief Complaint: Injury


Stated Complaint: FALL


Time Seen by Provider: 06/24/19 15:54


History Source: Patient


Exam Limitations: No Limitations





- History of Present Illness


Initial Comments: 





06/24/19 17:05


paper brought in by ambulancewith complaints of worsened pain. Suffers from 

severe rheumatoid arthritis. Was receiving treatment at physical therapy, 

rehabilitation facilityin the early spring but had to discontinue treatment due 

to family illness and death. States since that time has had multiple insurance 

issues and the loss of her primary care physician and has not taken any of her 

medications since the end of February. Came today with hopes she could be 

readmitted and sent to rehabilitation again as she has been unable to locate a 

physician to treat her multiple medical illnesses including rheumatologist


Occurred: reports: last week


Severity: reports: mild, moderate


Pain Location: reports: lower extremity


Associated Symptoms (Fall): denies symptoms





Past History





- Travel


Traveled outside of the country in the last 30 days: No


Close contact w/someone who was outside of country & ill: No





- Past Medical History


Allergies/Adverse Reactions: 


 Allergies











Allergy/AdvReac Type Severity Reaction Status Date / Time


 


No Known Allergies Allergy   Verified 06/24/19 15:54











Home Medications: 


Ambulatory Orders





Naproxen [Naprosyn -] 500 mg PO BID #30 tablet 06/24/19 








Anemia: Yes


COPD: No





- Suicide/Smoking/Psychosocial Hx


Smoking History: Never smoked


Have you smoked in the past 12 months: No


Number of Cigarettes Smoked Daily: 4


Cigars Per Day: 0


'Breaking Loose' booklet given: 09/25/18


Hx Alcohol Use: No


Drug/Substance Use Hx: No


Substance Use Type: None


Hx Substance Use Treatment: No





**Review of Systems





- Review of Systems


Able to Perform ROS?: Yes


Is the patient limited English proficient: Yes


Constitutional: Yes: Symptoms Reported, See HPI, Malaise.  No: Fever


Musculoskeletal: Yes: Symptoms Reported, See HPI, Joint Pain, Joint Swelling, 

Muscle Pain


Integumentary: No: Symptoms Reported


All Other Systems: Reviewed and Negative





*Physical Exam





- Vital Signs


 Last Vital Signs











Temp Pulse Resp BP Pulse Ox


 


 98.6 F   96 H  18   147/99   99 


 


 06/24/19 15:54  06/24/19 15:54  06/24/19 15:54  06/24/19 15:54  06/24/19 15:54














- Physical Exam


General Appearance: Yes: Nourished, Appropriately Dressed, Apparent Distress, 

Disheveled, Mild Distress, Obese (morbid)


HEENT: positive: REJI, Normal ENT Inspection, TMs Normal, Pharynx Normal


Neck: positive: Supple.  negative: Tender


Respiratory/Chest: positive: Lungs Clear


Gastrointestinal/Abdominal: positive: Soft.  negative: Tender


Musculoskeletal: positive: Normal Inspection, Other


Extremity: positive: Normal Capillary Refill, Other (multiple swan-neck 

deformities on all fingersof both hands. ankles are without point tenderness to 

medial or lateral malleolus, has some tenderness to midfoot but patient is 

morbidly obese and primarily nonambulatory.No crepitus or step-offs, negative 

squeeze test but difficult to assess due to wheelchairand immobility.)


Integumentary: positive: Dry, Warm, Swelling.  negative: Rash


Neurologic: positive: CNs II-XII NML intact, Fully Oriented, Alert, Normal Mood/

Affect, Normal Response, Motor Strength 5/5





Progress Note





- Progress Note


Progress Note: 





cute on chronic pain. X-ray negative for fractures or dislocation but 

significant arthritic changes consistent with patient's chronic illness/

rheumatoid arthritis. Ace wrap applied and patient will follow-up with PMD/

rheumatology/orthopedist





*DC/Admit/Observation/Transfer


Diagnosis at time of Disposition: 


Ankle pain, right


Qualifiers:


 Chronicity: chronic Qualified Code(s): M25.571 - Pain in right ankle and 

joints of right foot; G89.29 - Other chronic pain





Rheumatoid arthritis


Qualifiers:


 Rheumatoid arthritis location: hand Rheumatoid factor presence: unspecified 

presence Laterality: bilateral Qualified Code(s): M06.9 - Rheumatoid arthritis, 

unspecified








- Discharge Dispostion


Disposition: HOME


Condition at time of disposition: Stable


Decision to Admit order: No





- Referrals


Referrals: 


Rob Siddiqi MD [Staff Physician] - 


Momo Chavis MD [Staff Physician] - 





- Patient Instructions


Printed Discharge Instructions:  [facility] for Chronic Pain -- Adult


Additional Instructions: 


Rest, ice to area on and off for 15 minutes 4-6 times a day


Avoid heavy lifting or exercise until pain and swelling is resolved or until 

further directed


Keep area highly elevated to reduce swelling


Use splints/Ace wrap as directed


Followup with orthopedist in one to 2 days if not improving, 


    if significantly improved may wait one week for followup with orthopedist





May use ibuprofen every 6 hours as needed for pain





- Post Discharge Activity


Forms/Work/School Notes:  Back to Work

## 2019-09-01 ENCOUNTER — HOSPITAL ENCOUNTER (INPATIENT)
Dept: HOSPITAL 74 - JER | Age: 44
LOS: 4 days | Discharge: NURSING, REHAB OR LONG-TERM CARE | DRG: 342 | End: 2019-09-05
Attending: INTERNAL MEDICINE | Admitting: INTERNAL MEDICINE
Payer: COMMERCIAL

## 2019-09-01 VITALS — BODY MASS INDEX: 36.9 KG/M2

## 2019-09-01 DIAGNOSIS — D64.9: ICD-10-CM

## 2019-09-01 DIAGNOSIS — M06.9: ICD-10-CM
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
## 2019-09-01 DIAGNOSIS — E66.9: ICD-10-CM

## 2019-09-01 DIAGNOSIS — E88.09: ICD-10-CM

## 2019-09-01 DIAGNOSIS — S63.207A: ICD-10-CM

## 2019-09-01 DIAGNOSIS — D47.3: ICD-10-CM
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
## 2019-09-01 DIAGNOSIS — S83.001A: Primary | ICD-10-CM

## 2019-09-01 LAB
ALBUMIN SERPL-MCNC: 2.9 G/DL (ref 3.4–5)
ALP SERPL-CCNC: 87 U/L (ref 45–117)
ALT SERPL-CCNC: 13 U/L (ref 13–61)
ANION GAP SERPL CALC-SCNC: 11 MMOL/L (ref 8–16)
AST SERPL-CCNC: 19 U/L (ref 15–37)
BASOPHILS # BLD: 0.8 % (ref 0–2)
BILIRUB SERPL-MCNC: 0.4 MG/DL (ref 0.2–1)
BUN SERPL-MCNC: 12.4 MG/DL (ref 7–18)
CALCIUM SERPL-MCNC: 9.9 MG/DL (ref 8.5–10.1)
CHLORIDE SERPL-SCNC: 103 MMOL/L (ref 98–107)
CO2 SERPL-SCNC: 23 MMOL/L (ref 21–32)
CREAT SERPL-MCNC: 0.7 MG/DL (ref 0.55–1.3)
DEPRECATED RDW RBC AUTO: 19.4 % (ref 11.6–15.6)
EOSINOPHIL # BLD: 1.8 % (ref 0–4.5)
GLUCOSE SERPL-MCNC: 160 MG/DL (ref 74–106)
HCT VFR BLD CALC: 34 % (ref 32.4–45.2)
HGB BLD-MCNC: 11.2 GM/DL (ref 10.7–15.3)
LYMPHOCYTES # BLD: 12.3 % (ref 8–40)
MCH RBC QN AUTO: 25.4 PG (ref 25.7–33.7)
MCHC RBC AUTO-ENTMCNC: 33 G/DL (ref 32–36)
MCV RBC: 77 FL (ref 80–96)
MONOCYTES # BLD AUTO: 4.3 % (ref 3.8–10.2)
NEUTROPHILS # BLD: 80.8 % (ref 42.8–82.8)
PLATELET # BLD AUTO: 657 K/MM3 (ref 134–434)
PMV BLD: 7.8 FL (ref 7.5–11.1)
POTASSIUM SERPLBLD-SCNC: 3.5 MMOL/L (ref 3.5–5.1)
PROT SERPL-MCNC: 8 G/DL (ref 6.4–8.2)
RBC # BLD AUTO: 4.41 M/MM3 (ref 3.6–5.2)
SODIUM SERPL-SCNC: 138 MMOL/L (ref 136–145)
WBC # BLD AUTO: 6.4 K/MM3 (ref 4–10)

## 2019-09-01 PROCEDURE — 0RSVXZZ REPOSITION LEFT METACARPOPHALANGEAL JOINT, EXTERNAL APPROACH: ICD-10-PCS | Performed by: ORTHOPAEDIC SURGERY

## 2019-09-01 RX ADMIN — NAPROXEN SCH MG: 500 TABLET ORAL at 21:47

## 2019-09-01 RX ADMIN — SODIUM CHLORIDE SCH MLS/HR: 9 INJECTION, SOLUTION INTRAVENOUS at 18:46

## 2019-09-01 RX ADMIN — MORPHINE SULFATE PRN MG: 2 INJECTION, SOLUTION INTRAMUSCULAR; INTRAVENOUS at 18:33

## 2019-09-01 RX ADMIN — PREDNISONE SCH MG: 5 TABLET ORAL at 17:59

## 2019-09-01 NOTE — PDOC
History of Present Illness





- General


Chief Complaint: Injury


Stated Complaint: FALL


Time Seen by Provider: 09/01/19 11:07





- History of Present Illness


Initial Comments: 





09/01/19 12:08


44 year old woman with a history of advanced RA, iron def anemia and nicotine 

dependence who presents after a fall in the bathroom. The patient has been off 

all her RA meds for the past 1 week, since her sister could not  her 

medications in time. She reports she was in the bathroom right before getting 

into the shower, when she felt her knees rich under her. She denies hitting 

her head or losing consciousness. Her family found her right away and helped 

her to her bed. She reports pain on the L hand and wrist, R knee and R side of 

the chest. She has significant pain at the hands, knees and feet from her RA. 

She was in a Rehab in [facility]-3months ago and does not feel she can manage 

her RA outside of rehab. She has no other complaints.





ROS


GENERAL/CONSTITUTIONAL: No fever or chills. No weakness.


HEAD, EYES, EARS, NOSE AND THROAT: No change in vision. No ear pain or 

discharge. No sore throat.


CARDIOVASCULAR: No chest pain or shortness of breath


RESPIRATORY: No cough, wheezing, or hemoptysis.


GASTROINTESTINAL: No nausea, vomiting, diarrhea or constipation.


GENITOURINARY: No dysuria, frequency, or change in urination.


MUSCULOSKELETAL: No neck or back pain.


SKIN: No rash


NEUROLOGIC: No headache, vertigo, loss of consciousness, or change in strength/

sensation.





PE


GENERAL: Awake, alert, and fully oriented, in no acute distress


HEAD: No signs of trauma, normocephalic, atraumatic 


EYES: PERRLA, EOMI, sclera anicteric, conjunctiva clear


ENT: oropharynx clear without exudates. Moist mucosa


NECK: Normal ROM, supple


LUNGS: No distress, speaks full sentences, clear to auscultation bilaterally 


HEART: Regular rate and rhythm, normal S1 and S2, no murmurs, rubs or gallops, 

peripheral pulses normal and equal bilaterally. 


ABDOMEN: Soft, nontender, umbilical hernia,  No guarding, no rebound.  No masses


EXTREMITIES : [employer] neck defortmiy of the L fingers, defortmities of the 

bilateral toes


NEUROLOGICAL: Cranial nerves II through XII grossly intact.  Normal speech, 

normal gait, no focal sensorimotor deficits 


SKIN: Warm, Dry, normal turgor, no rashes or lesions noted








MDM





DDX including but not limited to:


Fall r/o fx


r/o infectious vs electrolyte 





W/U: 


-





TX:


- 





Scores: 





ED Course: 


XR with 5th subluxation of the digit


Discussed with Ortho Dr. Sears who will evaluate patient inpt and recommends 

volar splint





Patient ambulates with a walker and cannot bear weight at this time due to pain 











Flores Mayer, PGY2


Emergency Medicine














Past History





- Past Medical History


Allergies/Adverse Reactions: 


 Allergies











Allergy/AdvReac Type Severity Reaction Status Date / Time


 


No Known Allergies Allergy   Verified 06/24/19 15:54











Home Medications: 


Ambulatory Orders





Naproxen [Naprosyn -] 500 mg PO BID #30 tablet 06/24/19 


Ferrous Sulfate [Iron] 325 mg PO DAILY 09/01/19 


predniSONE [Deltasone -] 15 mg PO DAILY 09/01/19 








Anemia: Yes


COPD: No





- Suicide/Smoking/Psychosocial Hx


Smoking History: Never smoked


Have you smoked in the past 12 months: No


Number of Cigarettes Smoked Daily: 4


Cigars Per Day: 0


'Breaking Loose' booklet given: 09/25/18


Hx Alcohol Use: No


Drug/Substance Use Hx: No


Substance Use Type: None


Hx Substance Use Treatment: No





ED Treatment Course





- LABORATORY


CBC & Chemistry Diagram: 


 09/01/19 11:47





 09/01/19 11:34





*DC/Admit/Observation/Transfer


Diagnosis at time of Disposition: 


 Subluxation of left little finger, Rheumatoid arthritis








- Discharge Dispostion


Decision to Admit order: Yes





- Referrals





- Patient Instructions





- Post Discharge Activity

## 2019-09-01 NOTE — HP
CHIEF COMPLAINT: fall in bathtub





PCP: none





HISTORY OF PRESENT ILLNESS:


44 yof with PMHx of advanced Rheumatoid arthritis, reportedly on methrotrexate/

prednisone/naprosyn, discharged from [facility] rehab 2-3 weeks ago, since 

when has not been able to get her methotrexate, also been off her prednisone/

naproxen for 1 week as unable to get her meds, was in bathroom, trying to get 

out of bathtub when felt weak, unable to get back and felt on her lower back/

buttock/hip region, buckling of her right knee and supported with her right 

hand. reports has chronic pain from her RA, but worsening left wrist/right hip 

and right knee pain since the fall. Denies head/neck trauma. Uses walker and 

wheelchair and has been limited in her ADLs. 


12 point ROS done, has noted worsening joint stiffness, pain since being off 

her medications. Also decreased oral intake but no chest pain, palpitations, 

dizziness, dyspnea, head trauma. Otherwise neg on 12 point ROS. 





ER course was notable for:


(1) Pelvis/Hip xray, left wrist/hand xray, right knee xray





Recent Travel: Denies





PAST MEDICAL HISTORY: Advanced Rheumatoid Arthritis





PAST SURGICAL HISTORY: Denies





Social History:


Smokin-2 PPD since in her 20s, now cut down to 1 cig/day


Alcohol: Denies


Drugs: Denies


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 

limitations from her RA


lives with her children and family, uses walker and wheelchair to ambulate





Family History:


Allergies





No Known Allergies Allergy (Verified 19 15:54)


 








HOME MEDICATIONS:


 Home Medications











 Medication  Instructions  Recorded


 


Naproxen [Naprosyn -] 500 mg PO BID #30 tablet 19


 


Ferrous Sulfate [Iron] 325 mg PO DAILY 19


 


predniSONE [Deltasone -] 15 mg PO DAILY 19








REVIEW OF SYSTEMS


12 point ROS done, per HPI





PHYSICAL EXAMINATION


 Vital Signs - 24 hr











  19





  11:17


 


Temperature 98.6 F


 


Pulse Rate 114 H


 


Respiratory 19





Rate 


 


Blood Pressure 148/87


 


O2 Sat by Pulse 99





Oximetry (%) 








 Intake & Output











 19





 23:59 23:59 23:59 23:59


 


Weight    250 lb











GENERAL: Awake, alert, and fully oriented, in no acute distress, obese (BMI 36.9

).


HEAD: Normal with no signs of trauma.


EYES: Pupils equal, round and reactive to light, extraocular movements intact, 

sclera anicteric, conjunctiva clear. No lid lag.


EARS, NOSE, THROAT: Ears normal, nares patent, oropharynx clear without 

exudates. dry mucous membranes.


NECK: Normal range of motion, supple, no JVD visualized


LUNGS: Breath sounds equal, clear to auscultation bilaterally. No wheezes, and 

no crackles. No accessory muscle use.


HEART: Regular rate and rhythm, normal S1 and S2 without murmur, rub or gallop.


ABDOMEN: Soft, obese, NT throughout, ND, no voluntary or involuntary guarding 

or rigidity, pos bowel sounds


MUSCULOSKELETAL: Marked limitation in ROM at bilateral shoulder, knee, wrists, 

fingers, hips/knees, abduction deformity of the fingers and toes, tenderness of 

right patellar region, minimal movement of right hip (reports worse since fall)


UPPER EXTREMITIES: 2+ pulses, warm, well-perfused. No cyanosis. No clubbing. No 

peripheral edema, marked deformities as above, left wrist in volar splint, 

limited ROM.


LOWER EXTREMITIES: 2+ pulses, warm, well-perfused. No calf tenderness. No 

peripheral edema, marked limitation and arthritic changes a jhonatan. 


NEUROLOGICAL: AAOx3,facial symmetry, tongue midline, speech normal, marked 

limitation in ROM, power assessment difficult, 


PSYCHIATRIC: Cooperative. Good eye contact. Appropriate mood and affect.


SKIN: Warm, dry, normal turgor, no rashes or lesions noted, normal capillary 

refill. 


 Laboratory Results - last 24 hr











  19





  11:34 11:34 11:47


 


WBC    6.4


 


RBC    4.41


 


Hgb    11.2


 


Hct    34.0  D


 


MCV    77.0 L


 


MCH    25.4 L


 


MCHC    33.0


 


RDW    19.4 H


 


Plt Count    657 H


 


MPV    7.8


 


Absolute Neuts (auto)    5.2


 


Neutrophils %    80.8  D


 


Lymphocytes %    12.3  D


 


Monocytes %    4.3


 


Eosinophils %    1.8


 


Basophils %    0.8


 


Nucleated RBC %    0


 


ESR   


 


Sodium   138 


 


Potassium   3.5 


 


Chloride   103 


 


Carbon Dioxide   23 


 


Anion Gap   11 


 


BUN   12.4 


 


Creatinine   0.7 


 


Est GFR (CKD-EPI)AfAm   121.27 


 


Est GFR (CKD-EPI)NonAf   104.64 


 


Random Glucose   160 H 


 


Calcium   9.9 


 


Total Bilirubin   0.4 


 


AST   19 


 


ALT   13 


 


Alkaline Phosphatase   87 


 


C-Reactive Protein  8.6 H  


 


Total Protein   8.0 


 


Albumin   2.9 L 














  19





  11:47


 


WBC 


 


RBC 


 


Hgb 


 


Hct 


 


MCV 


 


MCH 


 


MCHC 


 


RDW 


 


Plt Count 


 


MPV 


 


Absolute Neuts (auto) 


 


Neutrophils % 


 


Lymphocytes % 


 


Monocytes % 


 


Eosinophils % 


 


Basophils % 


 


Nucleated RBC % 


 


ESR  94 H


 


Sodium 


 


Potassium 


 


Chloride 


 


Carbon Dioxide 


 


Anion Gap 


 


BUN 


 


Creatinine 


 


Est GFR (CKD-EPI)AfAm 


 


Est GFR (CKD-EPI)NonAf 


 


Random Glucose 


 


Calcium 


 


Total Bilirubin 


 


AST 


 


ALT 


 


Alkaline Phosphatase 


 


C-Reactive Protein 


 


Total Protein 


 


Albumin 








hip/pelvis/Left wrist/hand/right knee xrays results reviewed right patellar 

displacement, subluxation of fifth digit


EKG: NSR 77, QTc 475





ASSESSMENT/PLAN:


44 yof with PMHx of advanced Rheumatoid arthritis, reportedly on methrotrexate/

prednisone/naprosyn, discharged from [facility] rehab 2-3 weeks ago, off meds, 

comes with fall, suspected acute left 5th digit subluxation, right patellar 

displacement





-Mechanical fall


-Acute vs chronic left 5th digit subluxation


-Suspected acute right patellar displacement with joint swelling post fall


-r/o right hip fracture


-Advance Rheumatoid arthritis





Plan:


Orthopedic Dr. Sears consulted from ED. Left hand volar splint.


CT pelvis/right hip to assess patellar displacement and r/o fracture. 


NWB RLE/LUE till above work up


Pain control tylenol.


resume naproxyn/Prednisone. Rheumatology input. ?Resume methotrexate. Check ESR/

CRP.


PPI, DVTPPX lovenox


PT eval


Anticipate will need PT eval pending above work up and clinical improvement.


Admit to inpatient [facility]


Discussed with patient in detail, all questions answered


Care co-ordinated with ED


Total admit time 65 min. 











Visit type





- Emergency Visit


Emergency Visit: Yes


ED Registration Date: 19


Care time: The patient presented to the Emergency Department on the above date 

and was hospitalized for further evaluation of their emergent condition.





- New Patient


This patient is new to me today: Yes


Date on this admission: 19





- Critical Care


Critical Care patient: No

## 2019-09-01 NOTE — PN
Progress Note (short form)





- Note


Progress Note: 





The ER staff attempted closed reduction under local anesthesia. Post reduction x

-rays show that the joint is still dislocated vs subluxed. So I did a closed 

reduction under anesthesia. The joint definitely was reduced, then dislocated 

again. I reduced it again and splinted it in a reduced position but it is 

highly unstable. I explained this all to the patient. She understands, and very 

likely would chose non operative treatment. She understands that she may have 

permanent functional deficits, stiffness etc

## 2019-09-01 NOTE — PN
Progress Note (short form)





- Note


Progress Note: 





Pt seen and examined in ER. She is a 44 year old female patient with severe RA 

deformities of the left hand who fell earlier today, sustaining an injury to 

her left hand.





PE   Left hand has severe RA deformities, significant flexion deformities of 

the 2nd, 3rd, and 4th digit PIP joints. Nontender over those joints.


      + very tender over the left hand 5th MP joint with a palpable deformity.


      LUE including all fingers are grossly NVI


      + active flexion and extension of the left 5th finger, although limited 

due to pain and a mechanical block to motion





X-rays   Show a highly subluxed (as per radiology) vs dislocated left 5th MP 

joint





Imp   Dislocated left hand 5th finger MP joint.





Rec   Pt going into CT scan now, but I recommend attempted closed reduction 

under local anesthesia, after a metacarpal block.


      Then repeat x-ray to assess joint.


      Volar/ulnar gutter splint


      Elevation

## 2019-09-01 NOTE — PDOC
Documentation entered by Ori Romero SCRIBE, acting as scribe for Carlos Rivera MD.








Carlos Rivera MD:  This documentation has been prepared by the Heather watkins Nirvannie, SCRIBE, under my direction and personally reviewed by me in its 

entirety.  I confirm that the documentation accurately reflects all work, 

treatment, procedures, and medical decision making performed by me.  





Attending Attestation





- Resident


Resident Name: Flores Mayer





- ED Attending Attestation


I have performed the following: I have examined & evaluated the patient, The 

case was reviewed & discussed with the resident, I agree w/resident's findings 

& plan, Exceptions are as noted





- HPI


HPI: 





09/01/19 12:51


The patient is a 44 year old female, with a significant past medical history of 

RA and anemia, who presents to the emergency department s/p fall. Pt states her 

knees buckled while going to the bathroom. Pt landed on her side. Denies 

headstrike/LOC. She now endorses pain to the left hand, left wrist, right knee, 

and right hip. Pt was unable to get up on her own afterwards. Denies 

lightheadedness/dizziness/CP/SOB prior to falling.





She denies recent fevers, chills, headache or dizziness. She denies recent 

nausea, vomit, diarrhea or constipation. She denies recent dysuria, frequency, 

urgency or hematuria. 





Allergies: NKDA 








- Physicial Exam


PE: 





09/01/19 13:17


Agree with resident exam





- Medical Decision Making





09/01/19 13:18


44 F with mechanical fall today.


- XRs


- Attempt to ambulate





09/01/19 13:18


XR shows subluxation of L 5th MCP


Will discuss with ortho





09/01/19 14:48


Pt placed in splint.


Pt states she requires walker to ambulate at baseline. Unable to use it in this 

condition. Will admit to hospital for placement in rehab.

## 2019-09-02 LAB
ALBUMIN SERPL-MCNC: 2.4 G/DL (ref 3.4–5)
ALP SERPL-CCNC: 70 U/L (ref 45–117)
ALT SERPL-CCNC: 11 U/L (ref 13–61)
ANION GAP SERPL CALC-SCNC: 8 MMOL/L (ref 8–16)
AST SERPL-CCNC: 15 U/L (ref 15–37)
BASOPHILS # BLD: 0.7 % (ref 0–2)
BILIRUB SERPL-MCNC: 0.2 MG/DL (ref 0.2–1)
BUN SERPL-MCNC: 6.6 MG/DL (ref 7–18)
CALCIUM SERPL-MCNC: 8.9 MG/DL (ref 8.5–10.1)
CHLORIDE SERPL-SCNC: 107 MMOL/L (ref 98–107)
CO2 SERPL-SCNC: 26 MMOL/L (ref 21–32)
CREAT SERPL-MCNC: 0.4 MG/DL (ref 0.55–1.3)
DEPRECATED RDW RBC AUTO: 19.9 % (ref 11.6–15.6)
EOSINOPHIL # BLD: 1.6 % (ref 0–4.5)
GLUCOSE SERPL-MCNC: 133 MG/DL (ref 74–106)
HCT VFR BLD CALC: 27.3 % (ref 32.4–45.2)
HGB BLD-MCNC: 9 GM/DL (ref 10.7–15.3)
LYMPHOCYTES # BLD: 19.6 % (ref 8–40)
MAGNESIUM SERPL-MCNC: 1.4 MG/DL (ref 1.8–2.4)
MCH RBC QN AUTO: 25.5 PG (ref 25.7–33.7)
MCHC RBC AUTO-ENTMCNC: 32.9 G/DL (ref 32–36)
MCV RBC: 77.5 FL (ref 80–96)
MONOCYTES # BLD AUTO: 4.2 % (ref 3.8–10.2)
NEUTROPHILS # BLD: 73.9 % (ref 42.8–82.8)
PLATELET # BLD AUTO: 528 K/MM3 (ref 134–434)
PMV BLD: 7.6 FL (ref 7.5–11.1)
POTASSIUM SERPLBLD-SCNC: 3.4 MMOL/L (ref 3.5–5.1)
PROT SERPL-MCNC: 6.6 G/DL (ref 6.4–8.2)
RBC # BLD AUTO: 3.52 M/MM3 (ref 3.6–5.2)
SODIUM SERPL-SCNC: 141 MMOL/L (ref 136–145)
WBC # BLD AUTO: 7.1 K/MM3 (ref 4–10)

## 2019-09-02 RX ADMIN — SODIUM CHLORIDE SCH MLS/HR: 9 INJECTION, SOLUTION INTRAVENOUS at 04:13

## 2019-09-02 RX ADMIN — MORPHINE SULFATE PRN MG: 2 INJECTION, SOLUTION INTRAMUSCULAR; INTRAVENOUS at 01:13

## 2019-09-02 RX ADMIN — ENOXAPARIN SODIUM SCH MG: 40 INJECTION SUBCUTANEOUS at 10:24

## 2019-09-02 RX ADMIN — DOCUSATE SODIUM SCH: 100 CAPSULE, LIQUID FILLED ORAL at 21:28

## 2019-09-02 RX ADMIN — DOCUSATE SODIUM SCH: 100 CAPSULE, LIQUID FILLED ORAL at 18:40

## 2019-09-02 RX ADMIN — NAPROXEN SCH MG: 500 TABLET ORAL at 21:23

## 2019-09-02 RX ADMIN — PREDNISONE SCH MG: 5 TABLET ORAL at 10:24

## 2019-09-02 RX ADMIN — FOLIC ACID SCH MG: 1 TABLET ORAL at 10:24

## 2019-09-02 RX ADMIN — NAPROXEN SCH MG: 500 TABLET ORAL at 10:25

## 2019-09-02 RX ADMIN — FERROUS SULFATE TAB EC 324 MG (65 MG FE EQUIVALENT) SCH MG: 324 (65 FE) TABLET DELAYED RESPONSE at 18:39

## 2019-09-02 RX ADMIN — ENOXAPARIN SODIUM SCH: 40 INJECTION SUBCUTANEOUS at 10:33

## 2019-09-02 RX ADMIN — SENNOSIDES SCH TAB: 8.6 TABLET, FILM COATED ORAL at 21:24

## 2019-09-02 RX ADMIN — PANTOPRAZOLE SODIUM SCH MG: 40 TABLET, DELAYED RELEASE ORAL at 10:24

## 2019-09-02 NOTE — EKG
Test Reason : 

Blood Pressure : ***/*** mmHG

Vent. Rate : 077 BPM     Atrial Rate : 077 BPM

   P-R Int : 156 ms          QRS Dur : 092 ms

    QT Int : 420 ms       P-R-T Axes : 058 052 043 degrees

   QTc Int : 475 ms

 

NORMAL SINUS RHYTHM

NONSPECIFIC T WAVE ABNORMALITY

PROLONGED QT

ABNORMAL ECG

WHEN COMPARED WITH ECG OF 14-FEB-2019 08:22,

NO SIGNIFICANT CHANGE WAS FOUND

Confirmed by POLY MERLOS, JOCELYN ([id number]) on 9/2/2019 9:17:38 AM

 

Referred By:             Confirmed By:JOCELYN PANG MD

## 2019-09-02 NOTE — PN
Teaching Attending Note


Name of Resident: Scott Kaur





ATTENDING PHYSICIAN STATEMENT





I saw and evaluated the patient.


I reviewed the resident's note and discussed the case with the resident.


I agree with the resident's findings and plan as documented with exceptions 

below.








SUBJECTIVE:


Patient seen and examined, Joint symptoms improved. still with left wrist/right 

knee pain and limited activity. 





OBJECTIVE:


 Vital Signs











 Period  Temp  Pulse  Resp  BP Sys/Tesfaye  Pulse Ox


 


 Last 24 Hr  97.9 F-98.5 F  85-89  18-19  110-133/65-79  97-99








 Intake & Output











 08/30/19 08/31/19 09/01/19 09/02/19





 23:59 23:59 23:59 23:59


 


Intake Total   700 1000


 


Balance   700 1000


 


Weight   250 lb 








General: sitting in bed in no acute distress


Neck: soft, supple


HEENT: PERRLA, EOMI


CVS:S1S2 regular


Chest: limited by body habitus, no rales or wheezing


Abdomen:soft, obese, NT


Musculoskeletal: Marked limitation in ROM at bilateral shoulder, knee, wrists, 

fingers, hips/knees, abduction deformity of the fingers and toes, tenderness of 

right patellar region, minimal movement of right hip (reports worse since fall)


UPPER EXTREMITIES: 2+ pulses, warm, well-perfused. No cyanosis. No clubbing. No 

peripheral edema, marked deformities as above, left wrist in volar splint, 

limited ROM.


LOWER EXTREMITIES: 2+ pulses, warm, well-perfused. No calf tenderness. No 

peripheral edema, marked limitation and arthritic changes a jhonatan. 


NEUROLOGICAL: AAOx3,facial symmetry, tongue midline, speech normal, marked 

limitation in ROM, power assessment difficult to assess 





 Home Medications











 Medication  Instructions  Recorded


 


Naproxen [Naprosyn -] 500 mg PO BID #30 tablet 06/24/19


 


Ferrous Sulfate [Iron] 325 mg PO DAILY 09/01/19


 


predniSONE [Deltasone -] 15 mg PO DAILY 09/01/19








Active Medications





Acetaminophen (Tylenol -)  650 mg PO Q4H PRN


   PRN Reason: PAIN


Enoxaparin Sodium (Lovenox -)  40 mg SQ DAILY [facility]


   Last Admin: 09/02/19 10:33 Dose:  Not Given


Folic Acid (Folic Acid -)  1 mg PO DAILY [facility]


   Last Admin: 09/02/19 10:24 Dose:  1 mg


Methotrexate (Mexate -)  20 mg PO Mo@1000 [facility]


   Last Admin: 09/02/19 11:59 Dose:  20 mg


Naproxen (Naprosyn -)  500 mg PO BID [facility]


   Last Admin: 09/02/19 10:25 Dose:  500 mg


Oxycodone HCl (Roxicodone -)  5 mg PO Q4H PRN


   PRN Reason: PAIN LEVEL 1-5


   Last Admin: 09/02/19 10:23 Dose:  5 mg


Pantoprazole Sodium (Protonix -)  40 mg PO DAILY [facility]


   Last Admin: 09/02/19 10:24 Dose:  40 mg


Prednisone (Deltasone -)  15 mg PO DAILY [facility]


   Last Admin: 09/02/19 10:24 Dose:  15 mg


Sulfasalazine (Azulfidine En-Tabs -)  1,000 mg PO BID [facility]


   Last Admin: 09/02/19 11:58 Dose:  1,000 mg





 Laboratory Results - last 24 hr











  09/01/19 09/02/19 09/02/19





  11:47 06:46 06:46


 


WBC   7.1 


 


RBC   3.52 L 


 


Hgb   9.0 L 


 


Hct   27.3 L D 


 


MCV   77.5 L 


 


MCH   25.5 L 


 


MCHC   32.9 


 


RDW   19.9 H 


 


Plt Count   528 H 


 


MPV   7.6 


 


Absolute Neuts (auto)   5.3 


 


Neutrophils %   73.9 


 


Lymphocytes %   19.6  D 


 


Monocytes %   4.2 


 


Eosinophils %   1.6 


 


Basophils %   0.7 


 


Nucleated RBC %   0 


 


ESR  94 H  


 


PTT (Actin FS)    28.7


 


Sodium   


 


Potassium   


 


Chloride   


 


Carbon Dioxide   


 


Anion Gap   


 


BUN   


 


Creatinine   


 


Est GFR (CKD-EPI)AfAm   


 


Est GFR (CKD-EPI)NonAf   


 


Random Glucose   


 


Calcium   


 


Magnesium   


 


Total Bilirubin   


 


AST   


 


ALT   


 


Alkaline Phosphatase   


 


Total Protein   


 


Albumin   














  09/02/19





  06:46


 


WBC 


 


RBC 


 


Hgb 


 


Hct 


 


MCV 


 


MCH 


 


MCHC 


 


RDW 


 


Plt Count 


 


MPV 


 


Absolute Neuts (auto) 


 


Neutrophils % 


 


Lymphocytes % 


 


Monocytes % 


 


Eosinophils % 


 


Basophils % 


 


Nucleated RBC % 


 


ESR 


 


PTT (Actin FS) 


 


Sodium  141


 


Potassium  3.4 L


 


Chloride  107


 


Carbon Dioxide  26


 


Anion Gap  8


 


BUN  6.6 L


 


Creatinine  0.4 L


 


Est GFR (CKD-EPI)AfAm  145.79


 


Est GFR (CKD-EPI)NonAf  125.79


 


Random Glucose  133 H


 


Calcium  8.9


 


Magnesium  1.4 L


 


Total Bilirubin  0.2


 


AST  15


 


ALT  11 L


 


Alkaline Phosphatase  70


 


Total Protein  6.6


 


Albumin  2.4 L








CT Pelvis/LE and left hand xray reviewed





ASSESSMENT AND PLAN:


44 yof with PMx of advanced Rheumatoid arthritis, reportedly on methrotrexate/

prednisone/naprosyn, discharged from [facility] rehab 2-3 weeks ago, off meds, 

comes with fall, suspected acute left 5th digit subluxation, right patellar 

displacement





-Mechanical fall


-Acute vs chronic left 5th digit subluxation


-Right lateral patellar subluxation


-r/o right hip fracture


-Advance Rheumatoid arthritis





Plan:


Prior records reviewed


Will resume methotrexate 20 mg weekly, sulfasalazine 1 g BID. 


Continue naproxen, prednisone. PPI.


Follow up rheumatology input. 


ESR/CRP


Orthoepdic input noted, CT pevlis/LE results reviewed


Left hand volar splint. Follow up ortho/PT recs.


Anticipate SNF. 


DVTPPX lovenox


PT eval


Anticipate will need PT eval pending above work up and clinical improvement.


Discussed with patient and nursing, all questions answered.

## 2019-09-02 NOTE — PN
Progress Note (short form)





- Note


Progress Note: 





Pt seen, doing better today, more comfortable. She has no complaints of pain in 

the right knee.


Left hand in a volar ulnar gutter splint.


No severe swelling, NVI, overall doing fine, stable.


Will follow.


She can be discharged from an orthopedic pov, and f/u in 10-14 days as an out 

pt for repeat evaluation of the left hand.

## 2019-09-02 NOTE — PN
Physical Exam: 


SUBJECTIVE: Patient seen and examined at bedside. No acute events. Pt feels 

generally well








OBJECTIVE:





 Vital Signs











 Period  Temp  Pulse  Resp  BP Sys/Tesfaye  Pulse Ox


 


 Last 24 Hr  97.9 F-98.5 F  85-89  18-19  110-133/65-79  97-99











GEN: AAOx3, NAD


HEENT: NCAT, EOMI


Neck: no jvd


Cardio: rrr, normal s1s2, no mrg


Pulm: cta b/l


Abd: soft, nontender, nondistended


Ext: L arm with splint for 5th finger, R hip and knee TTP and on movement, 

sensation and circulation intact throughout











 Laboratory Results - last 24 hr











  09/01/19 09/01/19 09/01/19





  11:34 11:34 11:47


 


WBC    6.4


 


RBC    4.41


 


Hgb    11.2


 


Hct    34.0  D


 


MCV    77.0 L


 


MCH    25.4 L


 


MCHC    33.0


 


RDW    19.4 H


 


Plt Count    657 H


 


MPV    7.8


 


Absolute Neuts (auto)    5.2


 


Neutrophils %    80.8  D


 


Lymphocytes %    12.3  D


 


Monocytes %    4.3


 


Eosinophils %    1.8


 


Basophils %    0.8


 


Nucleated RBC %    0


 


ESR   


 


PTT (Actin FS)   


 


Sodium   138 


 


Potassium   3.5 


 


Chloride   103 


 


Carbon Dioxide   23 


 


Anion Gap   11 


 


BUN   12.4 


 


Creatinine   0.7 


 


Est GFR (CKD-EPI)AfAm   121.27 


 


Est GFR (CKD-EPI)NonAf   104.64 


 


Random Glucose   160 H 


 


Calcium   9.9 


 


Magnesium   


 


Total Bilirubin   0.4 


 


AST   19 


 


ALT   13 


 


Alkaline Phosphatase   87 


 


C-Reactive Protein  8.6 H  


 


Total Protein   8.0 


 


Albumin   2.9 L 














  09/01/19 09/02/19 09/02/19





  11:47 06:46 06:46


 


WBC   7.1 


 


RBC   3.52 L 


 


Hgb   9.0 L 


 


Hct   27.3 L D 


 


MCV   77.5 L 


 


MCH   25.5 L 


 


MCHC   32.9 


 


RDW   19.9 H 


 


Plt Count   528 H 


 


MPV   7.6 


 


Absolute Neuts (auto)   5.3 


 


Neutrophils %   73.9 


 


Lymphocytes %   19.6  D 


 


Monocytes %   4.2 


 


Eosinophils %   1.6 


 


Basophils %   0.7 


 


Nucleated RBC %   0 


 


ESR  94 H  


 


PTT (Actin FS)    28.7


 


Sodium   


 


Potassium   


 


Chloride   


 


Carbon Dioxide   


 


Anion Gap   


 


BUN   


 


Creatinine   


 


Est GFR (CKD-EPI)AfAm   


 


Est GFR (CKD-EPI)NonAf   


 


Random Glucose   


 


Calcium   


 


Magnesium   


 


Total Bilirubin   


 


AST   


 


ALT   


 


Alkaline Phosphatase   


 


C-Reactive Protein   


 


Total Protein   


 


Albumin   














  09/02/19





  06:46


 


WBC 


 


RBC 


 


Hgb 


 


Hct 


 


MCV 


 


MCH 


 


MCHC 


 


RDW 


 


Plt Count 


 


MPV 


 


Absolute Neuts (auto) 


 


Neutrophils % 


 


Lymphocytes % 


 


Monocytes % 


 


Eosinophils % 


 


Basophils % 


 


Nucleated RBC % 


 


ESR 


 


PTT (Actin FS) 


 


Sodium  141


 


Potassium  3.4 L


 


Chloride  107


 


Carbon Dioxide  26


 


Anion Gap  8


 


BUN  6.6 L


 


Creatinine  0.4 L


 


Est GFR (CKD-EPI)AfAm  145.79


 


Est GFR (CKD-EPI)NonAf  125.79


 


Random Glucose  133 H


 


Calcium  8.9


 


Magnesium  1.4 L


 


Total Bilirubin  0.2


 


AST  15


 


ALT  11 L


 


Alkaline Phosphatase  70


 


C-Reactive Protein 


 


Total Protein  6.6


 


Albumin  2.4 L








Active Medications











Generic Name Dose Route Start Last Admin





  Trade Name Freq  PRN Reason Stop Dose Admin


 


Acetaminophen  650 mg  09/01/19 17:06  





  Tylenol -  PO   





  Q4H PRN   





  PAIN   





     





     





     


 


Enoxaparin Sodium  40 mg  09/02/19 10:00  09/02/19 10:33





  Lovenox -  SQ   Not Given





  DAILY [facility]   





     





     





     





     


 


Folic Acid  1 mg  09/02/19 10:00  09/02/19 10:24





  Folic Acid -  PO   1 mg





  DAILY KALEB   Administration





     





     





     





     


 


Methotrexate  20 mg  09/02/19 10:30  09/02/19 11:59





  Mexate -  PO   20 mg





  Mo@1000 KALEB   Administration





     





     





     





     


 


Naproxen  500 mg  09/01/19 22:00  09/02/19 10:25





  Naprosyn -  PO   500 mg





  BID KALEB   Administration





     





     





     





     


 


Oxycodone HCl  5 mg  09/02/19 10:09  09/02/19 10:23





  Roxicodone -  PO   5 mg





  Q4H PRN   Administration





  PAIN LEVEL 1-5   





     





     





     


 


Pantoprazole Sodium  40 mg  09/02/19 10:00  09/02/19 10:24





  Protonix -  PO   40 mg





  DAILY KALEB   Administration





     





     





     





     


 


Prednisone  15 mg  09/01/19 17:15  09/02/19 10:24





  Deltasone -  PO   15 mg





  DAILY KALEB   Administration





     





     





     





     


 


Sulfasalazine  1,000 mg  09/02/19 10:00  09/02/19 11:58





  Azulfidine En-Tabs -  PO   1,000 mg





  BID KALEB   Administration





     





     





     





     











ASSESSMENT/PLAN:





Pt is a 45 y/o F with PMH RA who presents to ED with complaint of Fall and L 

hand, R knee, R hip pain. Pt has been found to have subluxation of the left 5th 

digit.





#L hand 5th digit subluxation


-reduced twice by Ortho and splinted in place


-will need f/u as outpt


-nonweight bearing


-f/o Ortho





#RA


-has been seen recently by rheumatologist Dr. Og


-Methotrexate 20mg weekly


-Sulfasalazine 1gram bid


-PT


-f/u out pt with [facility]











Visit type





- Emergency Visit


Emergency Visit: No





- New Patient


This patient is new to me today: Yes


Date on this admission: 09/02/19





- Critical Care


Critical Care patient: No





ATTENDING PHYSICIAN STATEMENT





I saw and evaluated the patient.


I reviewed the resident's note and discussed the case with the resident.


I agree with the resident's findings and plan as documented.








SUBJECTIVE:








OBJECTIVE:








ASSESSMENT AND PLAN:

## 2019-09-03 LAB
ALBUMIN SERPL-MCNC: 2.3 G/DL (ref 3.4–5)
ALP SERPL-CCNC: 67 U/L (ref 45–117)
ALT SERPL-CCNC: 9 U/L (ref 13–61)
ANION GAP SERPL CALC-SCNC: 6 MMOL/L (ref 8–16)
AST SERPL-CCNC: 11 U/L (ref 15–37)
BILIRUB SERPL-MCNC: 0.2 MG/DL (ref 0.2–1)
BUN SERPL-MCNC: 6.3 MG/DL (ref 7–18)
CALCIUM SERPL-MCNC: 8.2 MG/DL (ref 8.5–10.1)
CHLORIDE SERPL-SCNC: 110 MMOL/L (ref 98–107)
CO2 SERPL-SCNC: 27 MMOL/L (ref 21–32)
CREAT SERPL-MCNC: 0.4 MG/DL (ref 0.55–1.3)
DEPRECATED RDW RBC AUTO: 19.8 % (ref 11.6–15.6)
GLUCOSE SERPL-MCNC: 126 MG/DL (ref 74–106)
HCT VFR BLD CALC: 26.4 % (ref 32.4–45.2)
HGB BLD-MCNC: 8.6 GM/DL (ref 10.7–15.3)
MAGNESIUM SERPL-MCNC: 1.8 MG/DL (ref 1.8–2.4)
MCH RBC QN AUTO: 25.3 PG (ref 25.7–33.7)
MCHC RBC AUTO-ENTMCNC: 32.5 G/DL (ref 32–36)
MCV RBC: 77.8 FL (ref 80–96)
PHOSPHATE SERPL-MCNC: 2.6 MG/DL (ref 2.5–4.9)
PLATELET # BLD AUTO: 496 K/MM3 (ref 134–434)
PMV BLD: 7.6 FL (ref 7.5–11.1)
POTASSIUM SERPLBLD-SCNC: 3.5 MMOL/L (ref 3.5–5.1)
PROT SERPL-MCNC: 6.1 G/DL (ref 6.4–8.2)
RBC # BLD AUTO: 3.39 M/MM3 (ref 3.6–5.2)
SODIUM SERPL-SCNC: 143 MMOL/L (ref 136–145)
WBC # BLD AUTO: 5.9 K/MM3 (ref 4–10)

## 2019-09-03 RX ADMIN — ENOXAPARIN SODIUM SCH MG: 40 INJECTION SUBCUTANEOUS at 09:46

## 2019-09-03 RX ADMIN — PANTOPRAZOLE SODIUM SCH MG: 40 TABLET, DELAYED RELEASE ORAL at 09:48

## 2019-09-03 RX ADMIN — NAPROXEN SCH MG: 500 TABLET ORAL at 21:48

## 2019-09-03 RX ADMIN — FOLIC ACID SCH MG: 1 TABLET ORAL at 09:45

## 2019-09-03 RX ADMIN — SENNOSIDES SCH: 8.6 TABLET, FILM COATED ORAL at 21:48

## 2019-09-03 RX ADMIN — DOCUSATE SODIUM SCH: 100 CAPSULE, LIQUID FILLED ORAL at 15:01

## 2019-09-03 RX ADMIN — FERROUS SULFATE TAB EC 324 MG (65 MG FE EQUIVALENT) SCH MG: 324 (65 FE) TABLET DELAYED RESPONSE at 09:45

## 2019-09-03 RX ADMIN — DOCUSATE SODIUM SCH: 100 CAPSULE, LIQUID FILLED ORAL at 21:48

## 2019-09-03 RX ADMIN — PREDNISONE SCH MG: 5 TABLET ORAL at 09:45

## 2019-09-03 RX ADMIN — DOCUSATE SODIUM SCH: 100 CAPSULE, LIQUID FILLED ORAL at 05:54

## 2019-09-03 RX ADMIN — NAPROXEN SCH MG: 500 TABLET ORAL at 09:46

## 2019-09-03 NOTE — PN
Progress Note (short form)





- Note


Progress Note: 





Ortho





Pt seen and examined s/p right left 5th MP joint reduction





 Selected Entries











  09/03/19





  06:00


 


Temperature 98.7 F


 


Pulse Rate 72


 


Respiratory 18





Rate 


 


Blood Pressure 106/58 L








splint intact, nvi





a/p


maintain splint


f/u as outpt in 10-14 days


d/w Dr. Santana

## 2019-09-03 NOTE — PN
Physical Exam: 


SUBJECTIVE: Patient seen and examined at bedside. No acute events. Pt feels 

generally well. No complaints








OBJECTIVE:





 Vital Signs











 Period  Temp  Pulse  Resp  BP Sys/Tesfaye  Pulse Ox


 


 Last 24 Hr  97.8 F-98.7 F  69-84  17-20  106-121/58-76  98-98











GEN: AAOx3, NAD


HEENT: NCAT, EOMI


Neck: no jvd


Cardio: rrr, normal s1s2, no mrg


Pulm: cta b/l


Abd: soft, nontender, nondistended


Ext: L arm with splint for 5th finger, R hip and knee TTP and on movement, 

sensation and circulation intact throughout











 Laboratory Results - last 24 hr











  09/03/19 09/03/19





  05:30 05:30


 


WBC  5.9 


 


RBC  3.39 L 


 


Hgb  8.6 L 


 


Hct  26.4 L 


 


MCV  77.8 L 


 


MCH  25.3 L 


 


MCHC  32.5 


 


RDW  19.8 H 


 


Plt Count  496 H 


 


MPV  7.6 


 


Sodium   143


 


Potassium   3.5


 


Chloride   110 H


 


Carbon Dioxide   27


 


Anion Gap   6 L


 


BUN   6.3 L


 


Creatinine   0.4 L


 


Est GFR (CKD-EPI)AfAm   145.79


 


Est GFR (CKD-EPI)NonAf   125.79


 


Random Glucose   126 H


 


Calcium   8.2 L


 


Phosphorus   2.6


 


Magnesium   1.8


 


Total Bilirubin   0.2


 


AST   11 L


 


ALT   9 L


 


Alkaline Phosphatase   67


 


Total Protein   6.1 L


 


Albumin   2.3 L








Active Medications











Generic Name Dose Route Start Last Admin





  Trade Name Freq  PRN Reason Stop Dose Admin


 


Acetaminophen  650 mg  09/02/19 14:56  09/03/19 06:16





  Tylenol -  PO   650 mg





  Q4H PRN   Administration





  PAIN LEVEL 1-5   





     





     





     


 


Docusate Sodium  100 mg  09/02/19 15:00  09/03/19 15:01





  Colace -  PO   Not Given





  TID KALEB   





     





     





     





     


 


Enoxaparin Sodium  40 mg  09/02/19 10:00  09/03/19 09:46





  Lovenox -  SQ   40 mg





  DAILY KALEB   Administration





     





     





     





     


 


Ferrous Sulfate  325 mg  09/02/19 15:00  09/03/19 09:45





  Feosol -  PO   325 mg





  DAILY KALEB   Administration





     





     





     





     


 


Folic Acid  1 mg  09/02/19 10:00  09/03/19 09:45





  Folic Acid -  PO   1 mg





  DAILY KALEB   Administration





     





     





     





     


 


Methotrexate  20 mg  09/02/19 10:30  09/02/19 11:59





  Mexate -  PO   20 mg





  Mo@1000 KALEB   Administration





     





     





     





     


 


Naproxen  500 mg  09/01/19 22:00  09/03/19 09:46





  Naprosyn -  PO   500 mg





  BID KALEB   Administration





     





     





     





     


 


Oxycodone HCl  5 mg  09/02/19 14:56  09/03/19 11:30





  Roxicodone -  PO   5 mg





  Q6H PRN   Administration





  PAIN LEVEL 6-10   





     





     





     


 


Pantoprazole Sodium  40 mg  09/02/19 10:00  09/03/19 09:48





  Protonix -  PO   40 mg





  DAILY KALEB   Administration





     





     





     





     


 


Polyethylene Glycol  17 gm  09/02/19 14:56  





  Miralax (For Daily Use) -  PO   





  DAILY PRN   





  CONSTIPATION   





     





     





     


 


Prednisone  15 mg  09/01/19 17:15  09/03/19 09:45





  Deltasone -  PO   15 mg





  DAILY KAL[facility]   Administration





     





     





     





     


 


Senna  2 tab  09/02/19 22:00  09/02/19 21:24





  Senna -  PO   2 tab





  HS KALEB   Administration





     





     





     





     


 


Sulfasalazine  1,000 mg  09/02/19 10:00  09/03/19 09:47





  Azulfidine En-Tabs -  PO   1,000 mg





  BID KALEB   Administration





     





     





     





     











ASSESSMENT/PLAN:


Pt is a 43 y/o F with PMH RA who presents to ED with complaint of Fall and L 

hand, R knee, R hip pain. Pt has been found to have subluxation of the left 5th 

digit.





#L hand 5th digit subluxation


-reduced twice by Ortho and splinted in place


-will need f/u as outpt


-nonweight bearing


-f/o Ortho





#RA


-has been seen recently by rheumatologist Dr. Og


-Methotrexate 20mg weekly


-Sulfasalazine 1gram bid


-PT


-f/u out pt with [facility]

















Visit type





- Emergency Visit


Emergency Visit: No





- New Patient


This patient is new to me today: No





- Critical Care


Critical Care patient: No





ATTENDING PHYSICIAN STATEMENT





I saw and evaluated the patient.


I reviewed the resident's note and discussed the case with the resident.


I agree with the resident's findings and plan as documented.








SUBJECTIVE:








OBJECTIVE:








ASSESSMENT AND PLAN:

## 2019-09-03 NOTE — PN
Physical Exam: 


SUBJECTIVE: Patient seen and examined, pain improved, no new concerns. 








OBJECTIVE:





 Vital Signs











 Period  Temp  Pulse  Resp  BP Sys/Tesfaye  Pulse Ox


 


 Last 24 Hr  97.8 F-98.7 F  69-77  17-20  106-138/58-79  98-98








 Intake & Output











 08/31/19 09/01/19 09/02/19 09/03/19





 23:59 23:59 23:59 23:59


 


Intake Total  700 1200 500


 


Output Total    1200


 


Balance  700 1200 -700


 


Weight  250 lb  











General: sitting in bed in no acute distress


Neck: soft, supple


HEENT: PERRLA, EOMI


CVS:S1S2 regular


Chest: limited by body habitus, no rales or wheezing


Abdomen:soft, obese, NT


Musculoskeletal: Marked limitation in ROM at bilateral shoulder, knee, wrists, 

fingers, hips/knees, abduction deformity of the fingers and toes, improved 

tenderness of right patellar region, minimal movement of right hip (reports 

worse since fall)


UPPER EXTREMITIES: 2+ pulses, warm, well-perfused. No cyanosis. No clubbing. No 

peripheral edema, marked deformities as above, left wrist in volar splint, 

limited ROM.


LOWER EXTREMITIES: 2+ pulses, warm, well-perfused. No calf tenderness. No 

peripheral edema, marked limitation and arthritic changes a jhonatan. 


NEUROLOGICAL: AAOx3,facial symmetry, tongue midline, speech normal, marked 

limitation in ROM, power assessment difficult to assess 











 Laboratory Results - last 24 hr











  09/03/19 09/03/19





  05:30 05:30


 


WBC  5.9 


 


RBC  3.39 L 


 


Hgb  8.6 L 


 


Hct  26.4 L 


 


MCV  77.8 L 


 


MCH  25.3 L 


 


MCHC  32.5 


 


RDW  19.8 H 


 


Plt Count  496 H 


 


MPV  7.6 


 


Sodium   143


 


Potassium   3.5


 


Chloride   110 H


 


Carbon Dioxide   27


 


Anion Gap   6 L


 


BUN   6.3 L


 


Creatinine   0.4 L


 


Est GFR (CKD-EPI)AfAm   145.79


 


Est GFR (CKD-EPI)NonAf   125.79


 


Random Glucose   126 H


 


Calcium   8.2 L


 


Phosphorus   2.6


 


Magnesium   1.8


 


Total Bilirubin   0.2


 


AST   11 L


 


ALT   9 L


 


Alkaline Phosphatase   67


 


Total Protein   6.1 L


 


Albumin   2.3 L








Active Medications











Generic Name Dose Route Start Last Admin





  Trade Name Freq  PRN Reason Stop Dose Admin


 


Acetaminophen  650 mg  09/02/19 14:56  09/03/19 06:16





  Tylenol -  PO   650 mg





  Q4H PRN   Administration





  PAIN LEVEL 1-5   





     





     





     


 


Docusate Sodium  100 mg  09/02/19 15:00  09/03/19 05:54





  Colace -  PO   Not Given





  [facility]   





     





     





     





     


 


Enoxaparin Sodium  40 mg  09/02/19 10:00  09/03/19 09:46





  Lovenox -  SQ   40 mg





  DAILY KALEB   Administration





     





     





     





     


 


Ferrous Sulfate  325 mg  09/02/19 15:00  09/03/19 09:45





  Feosol -  PO   325 mg





  DAILY KALEB   Administration





     





     





     





     


 


Folic Acid  1 mg  09/02/19 10:00  09/03/19 09:45





  Folic Acid -  PO   1 mg





  DAILY KALEB   Administration





     





     





     





     


 


Methotrexate  20 mg  09/02/19 10:30  09/02/19 11:59





  Mexate -  PO   20 mg





  Mo@1000 [facility]   Administration





     





     





     





     


 


Naproxen  500 mg  09/01/19 22:00  09/03/19 09:46





  Naprosyn -  PO   500 mg





  BID KALEB   Administration





     





     





     





     


 


Oxycodone HCl  5 mg  09/02/19 14:56  09/03/19 11:30





  Roxicodone -  PO   5 mg





  Q6H PRN   Administration





  PAIN LEVEL 6-10   





     





     





     


 


Pantoprazole Sodium  40 mg  09/02/19 10:00  09/03/19 09:48





  Protonix -  PO   40 mg





  DAILY KALEB   Administration





     





     





     





     


 


Polyethylene Glycol  17 gm  09/02/19 14:56  





  Miralax (For Daily Use) -  PO   





  DAILY PRN   





  CONSTIPATION   





     





     





     


 


Prednisone  15 mg  09/01/19 17:15  09/03/19 09:45





  Deltasone -  PO   15 mg





  DAILY KALEB   Administration





     





     





     





     


 


Senna  2 tab  09/02/19 22:00  09/02/19 21:24





  Senna -  PO   2 tab





  HS KALEB   Administration





     





     





     





     


 


Sulfasalazine  1,000 mg  09/02/19 10:00  09/03/19 09:47





  Azulfidine En-Tabs -  PO   1,000 mg





  BID KALEB   Administration





     





     





     





     











ASSESSMENT/PLAN:


44 yof with PMHx of advanced Rheumatoid arthritis, reportedly on methrotrexate/

prednisone/naprosyn, discharged from [facility] rehab 2-3 weeks ago, off meds, 

comes with fall, suspected acute left 5th digit subluxation, right patellar 

displacement





-Mechanical fall


-Acute vs chronic left 5th digit subluxation


-Right lateral patellar subluxation


-r/o right hip fracture


-Advance Rheumatoid arthritis





Plan:


Orthopedic input noted. Left volar splint, outpatient follow up.


Methotrexate/Sulfasalazine resumed.  


Continue naproxen, prednisone. PPI.


Rheumatology follow up for prednisone taper. 


ESR/CRP noted. 


Anticipate SNF. 


DVTPPX lovenox


PT eval


dispo anticipate SNF when disposition arranged with outpatient orthopedic and 

rheumatology follow up


Discussed with patient and nursing, all questions answered. 





Visit type





- Emergency Visit


Emergency Visit: Yes


ED Registration Date: 09/01/19


Care time: The patient presented to the Emergency Department on the above date 

and was hospitalized for further evaluation of their emergent condition.





- New Patient


This patient is new to me today: No





- Critical Care


Critical Care patient: No





- Discharge Referral


Referred to [facility] No

## 2019-09-04 LAB
DEPRECATED RDW RBC AUTO: 19.8 % (ref 11.6–15.6)
HCT VFR BLD CALC: 27.7 % (ref 32.4–45.2)
HGB BLD-MCNC: 8.9 GM/DL (ref 10.7–15.3)
MCH RBC QN AUTO: 25.2 PG (ref 25.7–33.7)
MCHC RBC AUTO-ENTMCNC: 32 G/DL (ref 32–36)
MCV RBC: 78.7 FL (ref 80–96)
PLATELET # BLD AUTO: 502 K/MM3 (ref 134–434)
PMV BLD: 7.4 FL (ref 7.5–11.1)
RBC # BLD AUTO: 3.52 M/MM3 (ref 3.6–5.2)
WBC # BLD AUTO: 5.5 K/MM3 (ref 4–10)

## 2019-09-04 RX ADMIN — NAPROXEN SCH MG: 500 TABLET ORAL at 11:25

## 2019-09-04 RX ADMIN — DOCUSATE SODIUM SCH: 100 CAPSULE, LIQUID FILLED ORAL at 06:04

## 2019-09-04 RX ADMIN — PANTOPRAZOLE SODIUM SCH MG: 40 TABLET, DELAYED RELEASE ORAL at 09:13

## 2019-09-04 RX ADMIN — FOLIC ACID SCH MG: 1 TABLET ORAL at 09:13

## 2019-09-04 RX ADMIN — FERROUS SULFATE TAB EC 324 MG (65 MG FE EQUIVALENT) SCH MG: 324 (65 FE) TABLET DELAYED RESPONSE at 09:13

## 2019-09-04 RX ADMIN — SENNOSIDES SCH TAB: 8.6 TABLET, FILM COATED ORAL at 21:04

## 2019-09-04 RX ADMIN — NAPROXEN SCH MG: 500 TABLET ORAL at 21:04

## 2019-09-04 RX ADMIN — ENOXAPARIN SODIUM SCH: 40 INJECTION SUBCUTANEOUS at 09:12

## 2019-09-04 RX ADMIN — PREDNISONE SCH MG: 5 TABLET ORAL at 09:13

## 2019-09-04 RX ADMIN — GLYCERIN ONE EACH: 2.1 SUPPOSITORY RECTAL at 11:25

## 2019-09-04 RX ADMIN — ENOXAPARIN SODIUM SCH MG: 40 INJECTION SUBCUTANEOUS at 09:25

## 2019-09-04 RX ADMIN — DOCUSATE SODIUM SCH MG: 100 CAPSULE, LIQUID FILLED ORAL at 21:04

## 2019-09-04 RX ADMIN — DOCUSATE SODIUM SCH MG: 100 CAPSULE, LIQUID FILLED ORAL at 13:39

## 2019-09-04 NOTE — PN
Teaching Attending Note


Name of Resident: Scott Kaur





ATTENDING PHYSICIAN STATEMENT





I saw and evaluated the patient.


I reviewed the resident's note and discussed the case with the resident.


I agree with the resident's findings and plan as documented.








Seen and examined; symptoms continue to remain improved without worsening 

swelling, etc.  Cleared for DC by ortho; discussing length of steroid taper 

with Dr. Og and occasion for followup.  Continues on MTX/Sulfa/Prednisone 

with PPI for ppx given concurring BID Naproxen.  





VS, labs, imaging reviewed


NAD, AAO, resting in bed


NC AT EOMI PERRLA


RRR s1/2


Lungs CTAB, w/ sym exp


NT ND +bs


Remains in volar splint L-hand; remains neurovasc intact


CN2-12 wnl, no fnd.  Stereotyped RA findings


Normal mood, appropriate behavior





Reviewed all relevent labs and imaging





ASSESSMENT AND PLAN:


In summation, a 45 y/o female with severe RA presenting with a L-hand fx that 

is s/p splinting by Orthopedic Sgy; she is on steroid taper and DMARDs for her 

advanced AI condition and has appreciated some improvement. She was recently at 

rehab for related issues and has some impaired functional status resulting from 

her disease.  Problems include:





-L-hand 5th digit subluxation  (Followup with orthopedics per instructions; L-

hand to volar gutter splint.  Improved and remains neurovasc intact)


-R-Patellar sublux (stable; PRN management and followup with ortho)


-R-Hip Pain-improved, no fracture.


-Seropositive RA with polyarticular involvement (on 20mg MTX QMonday, 1 BID 

Sulfa, BID Naproxen, 15 pred with plans to taper.  She has seen Dr. Og in 

the past.  Calling his service to discuss ongoing taper and to arrange for 

followup.  Continue PPI for gi px.)


-Chronic Anemia (continue iron)


-Reactive thrombocytosis (Likely 2/2 RA flare; trending down.  Monitor and FU 

CBC 5-7 days post DC)


-Hypoalbuminemia (Secondary to chronic disease state; consider OP prealbumin 

and nutrition referral)


-Obesity (BMI=36;   prior to DC)





DVT px: Lovenox


GI px: PPI-will DC on given NSAID/prednisone





Dispo: DC pending PT input, discussion with rheum





Full Code

## 2019-09-04 NOTE — CONSULT
Consult


Consult Specialty:: Rheumatology





- History of Present Illness


History of Present Illness: 


44 year old female with sero-positive rheumatoid arthritis not treated 

adequately for many years and significant damage in multiple joints. She was 

discharged from [facility] rehab 2-3 weeks ago, admitted after she had a fall 

due to buckling of her right knee and supported with her right hand.  She has 

not have rheumatology follow-up as outpatient.





On admission the patient was found to have a dislocated left 5th MCP.  The ER 

staff was unsuccessful in attempted closed reduction under local anesthesia and 

 did a closed reduction under anesthesia, however it dislocated 

again. It was reduced again and splinted it in a reduced position.  





Rheumatoid arthritis.   History of intolerance to Leflunomide.   On 

Sulfasalazine 1 gr BID and Methotrexate 20 mg/week  (since 9/28/18, started in 

previous [facility] [place] admission).  On the 2/15/19 admission she had on the 

musculoskeletal examination,14 swollen and 4 tender joints.





  She reports that prior to the admission she was walking and shopping.   She 

had pain mainly in knees and ankles, she denies pain in hands and she has 1 

hour morning stiffness. 





Laboratory work-up revealed CVC with WBC of 5.5, Hgb 8.9 and platelets 502.  

ESR 94.  Creatinine 0.4, LFT normal and Albumin 2.3.





X ray of the right wrist revealed collapse of carpus with narrowing of radio-

carpal and intracarlpa joints and erosions.  Subluxation of 5th MCP.  Eriosions 

in 1st and 3rd MCPs.  PIPs could not be evaluated,.














- Past Medical History


...LMP: 09/15/18


...Pregnant: No





- Alcohol/Substance Use


Hx Alcohol Use: No





- Smoking History


Smoking history: Former smoker


Have you smoked in the past 12 months: No


Aproximately how many cigarettes per day: 4





Home Medications





- Allergies


Allergies/Adverse Reactions: 


 Allergies











Allergy/AdvReac Type Severity Reaction Status Date / Time


 


No Known Allergies Allergy   Verified 06/24/19 15:54














- Home Medications


Home Medications: 


Ambulatory Orders





Naproxen [Naprosyn -] 500 mg PO BID #30 tablet 06/24/19 


Ferrous Sulfate [Iron] 325 mg PO DAILY 09/01/19 


predniSONE [Deltasone -] 15 mg PO DAILY 09/01/19 











Review of Systems





- Review of Systems


Constitutional: reports: Malaise


Eyes: reports: No Symptoms


HENT: reports: No Symptoms


Neck: reports: No Symptoms


Cardiovascular: reports: No Symptoms


Respiratory: reports: No Symptoms


Gastrointestinal: reports: No Symptoms


Musculoskeletal: reports: Other (See HPI)


Integumentary: reports: No Symptoms





Physical Exam


Vital Signs: 


 Vital Signs











Temperature  98.8 F   09/04/19 13:32


 


Pulse Rate  73   09/04/19 13:32


 


Respiratory Rate  20   09/04/19 13:32


 


Blood Pressure  128/72   09/04/19 13:32


 


O2 Sat by Pulse Oximetry (%)  98   09/04/19 09:00











Constitutional: Yes: Moderate Distress


Eyes: Yes: WNL


HENT: Yes: WNL


Neck: Yes: WNL


Cardiovascular: Yes: WNL


Respiratory: Yes: WNL


Gastrointestinal: Yes: WNL


Musculoskeletal: Yes: Other (Swelling of the right wrist and right 5th PIP.   

Flexion contracture of PIPs.  Tendency to keep hands closed.  Mild tenderness 

in knees and ankles and no other active joints.)


Labs: 


 CBC, BMP





 09/04/19 09:22 





 09/03/19 05:30 





 Laboratory Tests











  09/01/19 09/03/19





  11:47 05:30


 


ESR  94 H 


 


Random Glucose   126 H


 


Calcium   8.2 L


 


Phosphorus   2.6


 


Magnesium   1.8


 


Total Bilirubin   0.2


 


AST   11 L


 


ALT   9 L


 


Alkaline Phosphatase   67


 


Total Protein   6.1 L


 


Albumin   2.3 L














Problem List





- Problems


(1) Rheumatoid arthritis


Assessment/Plan: 


Sero-posiive, erosive rheumatoid arthritis.  PArtial improvement with 

Sulfasalazine and Methotrexate, however the patient requires biological DMARD 

to be prescribed as outpatient by her rheumatologist. 


Plan: continue with same medications. 


Code(s): M06.9 - RHEUMATOID ARTHRITIS, UNSPECIFIED

## 2019-09-04 NOTE — PN
Physical Exam: 


SUBJECTIVE: Patient seen and examined at bedside. No acute events. Pt feels 

generally well. No complaints. Waiting for placement








OBJECTIVE:





 Vital Signs











 Period  Temp  Pulse  Resp  BP Sys/Tesfaye  Pulse Ox


 


 Last 24 Hr  97.9 F-98.8 F  61-77  18-20  111-138/55-75  98











GEN: AAOx3, NAD


HEENT: NCAT, EOMI


Neck: no jvd


Cardio: rrr, normal s1s2, no mrg


Pulm: cta b/l


Abd: soft, nontender, nondistended


Ext: L arm with splint for 5th finger, R hip and knee TTP and on movement, 

sensation and circulation intact throughout











 Laboratory Results - last 24 hr











  09/04/19





  09:22


 


WBC  5.5


 


RBC  3.52 L


 


Hgb  8.9 L


 


Hct  27.7 L


 


MCV  78.7 L


 


MCH  25.2 L


 


MCHC  32.0


 


RDW  19.8 H


 


Plt Count  502 H


 


MPV  7.4 L








Active Medications











Generic Name Dose Route Start Last Admin





  Trade Name Freq  PRN Reason Stop Dose Admin


 


Acetaminophen  650 mg  09/02/19 14:56  09/03/19 06:16





  Tylenol -  PO   650 mg





  Q4H PRN   Administration





  PAIN LEVEL 1-5   





     





     





     


 


Docusate Sodium  100 mg  09/02/19 15:00  09/04/19 21:04





  Colace -  PO   100 mg





  TID KALEB   Administration





     





     





     





     


 


Enoxaparin Sodium  40 mg  09/02/19 10:00  09/04/19 09:25





  Lovenox -  SQ   40 mg





  DAILY KALEB   Administration





     





     





     





     


 


Ferrous Sulfate  325 mg  09/02/19 15:00  09/04/19 09:13





  Feosol -  PO   325 mg





  DAILY KALEB   Administration





     





     





     





     


 


Folic Acid  1 mg  09/02/19 10:00  09/04/19 09:13





  Folic Acid -  PO   1 mg





  DAILY KALEB   Administration





     





     





     





     


 


Methotrexate  20 mg  09/02/19 10:30  09/02/19 11:59





  Mexate -  PO   20 mg





  Mo@1000 KALEB   Administration





     





     





     





     


 


Naproxen  500 mg  09/01/19 22:00  09/04/19 21:04





  Naprosyn -  PO   500 mg





  BID KALEB   Administration





     





     





     





     


 


Oxycodone HCl  5 mg  09/02/19 14:56  09/04/19 10:00





  Roxicodone -  PO   5 mg





  Q6H PRN   Administration





  PAIN LEVEL 6-10   





     





     





     


 


Pantoprazole Sodium  40 mg  09/02/19 10:00  09/04/19 09:13





  Protonix -  PO   40 mg





  DAILY KALEB   Administration





     





     





     





     


 


Polyethylene Glycol  17 gm  09/02/19 14:56  





  Miralax (For Daily Use) -  PO   





  DAILY PRN   





  CONSTIPATION   





     





     





     


 


Prednisone  15 mg  09/01/19 17:15  09/04/19 09:13





  Deltasone -  PO   15 mg





  DAILY KALEB   Administration





     





     





     





     


 


Senna  2 tab  09/02/19 22:00  09/04/19 21:04





  Senna -  PO   2 tab





  HS KALEB   Administration





     





     





     





     


 


Sulfasalazine  1,000 mg  09/02/19 10:00  09/04/19 21:04





  Azulfidine En-Tabs -  PO   1,000 mg





  BID KALEB   Administration





     





     





     





     











ASSESSMENT/PLAN:


Pt is a 43 y/o F with PMH RA who presents to ED with complaint of Fall and L 

hand, R knee, R hip pain. Pt has been found to have subluxation of the left 5th 

digit.





#L hand 5th digit subluxation


-reduced twice by Ortho and splinted in place


-will need f/u as outpt


-nonweight bearing


-f/o Ortho outpt





#RA


-has been seen recently by rheumatologist Dr. Og


-Methotrexate 20mg weekly


-Sulfasalazine 1gram bid


-PT


-f/u out pt with [facility] (seen today)











Visit type





- Emergency Visit


Emergency Visit: No





- New Patient


This patient is new to me today: No





- Critical Care


Critical Care patient: No





ATTENDING PHYSICIAN STATEMENT





I saw and evaluated the patient.


I reviewed the resident's note and discussed the case with the resident.


I agree with the resident's findings and plan as documented.








SUBJECTIVE:








OBJECTIVE:








ASSESSMENT AND PLAN:

## 2019-09-05 VITALS — SYSTOLIC BLOOD PRESSURE: 131 MMHG | TEMPERATURE: 98.9 F | DIASTOLIC BLOOD PRESSURE: 77 MMHG | HEART RATE: 83 BPM

## 2019-09-05 LAB
DEPRECATED RDW RBC AUTO: 20.3 % (ref 11.6–15.6)
HCT VFR BLD CALC: 28.3 % (ref 32.4–45.2)
HGB BLD-MCNC: 9.2 GM/DL (ref 10.7–15.3)
MCH RBC QN AUTO: 25.3 PG (ref 25.7–33.7)
MCHC RBC AUTO-ENTMCNC: 32.4 G/DL (ref 32–36)
MCV RBC: 78.2 FL (ref 80–96)
PLATELET # BLD AUTO: 510 K/MM3 (ref 134–434)
PMV BLD: 7.7 FL (ref 7.5–11.1)
RBC # BLD AUTO: 3.63 M/MM3 (ref 3.6–5.2)
WBC # BLD AUTO: 5.9 K/MM3 (ref 4–10)

## 2019-09-05 RX ADMIN — CAMPHOR, MENTHOL ONE: .5; .5 LOTION CUTANEOUS at 03:30

## 2019-09-05 RX ADMIN — FERROUS SULFATE TAB EC 324 MG (65 MG FE EQUIVALENT) SCH MG: 324 (65 FE) TABLET DELAYED RESPONSE at 11:00

## 2019-09-05 RX ADMIN — FOLIC ACID SCH MG: 1 TABLET ORAL at 11:00

## 2019-09-05 RX ADMIN — PREDNISONE SCH MG: 5 TABLET ORAL at 10:59

## 2019-09-05 RX ADMIN — GLYCERIN ONE: 2.1 SUPPOSITORY RECTAL at 06:23

## 2019-09-05 RX ADMIN — ENOXAPARIN SODIUM SCH: 40 INJECTION SUBCUTANEOUS at 11:00

## 2019-09-05 RX ADMIN — CAMPHOR, MENTHOL ONE APPLIC: .5; .5 LOTION CUTANEOUS at 04:00

## 2019-09-05 RX ADMIN — DOCUSATE SODIUM SCH: 100 CAPSULE, LIQUID FILLED ORAL at 14:22

## 2019-09-05 RX ADMIN — PANTOPRAZOLE SODIUM SCH MG: 40 TABLET, DELAYED RELEASE ORAL at 10:59

## 2019-09-05 RX ADMIN — NAPROXEN SCH MG: 500 TABLET ORAL at 11:01

## 2019-09-05 RX ADMIN — DOCUSATE SODIUM SCH: 100 CAPSULE, LIQUID FILLED ORAL at 06:22

## 2019-09-05 NOTE — DS
Physical Exam: 


SUBJECTIVE: Patient seen and examined








OBJECTIVE:





 Vital Signs











 Period  Temp  Pulse  Resp  BP Sys/Tesfaye  Pulse Ox


 


 Last 24 Hr  98 F-98.9 F  67-83  18-18  120-141/70-83  100








PHYSICAL EXAM





GENERAL: The patient is awake, alert, and fully oriented, in no acute distress.


HEAD: Normal with no signs of trauma.


EYES: PERRL, extraocular movements intact, sclera anicteric, conjunctiva clear. 


ENT: Ears normal, nares patent, oropharynx clear without exudates, moist mucous 

membranes.


NECK: Trachea midline, full range of motion, supple. 


LUNGS: Breath sounds equal, clear to auscultation bilaterally, no wheezes, no 

crackles, no accessory muscle use. 


HEART: Regular rate and rhythm, S1, S2 without murmur, rub or gallop.


ABDOMEN: Soft, nontender, nondistended, normoactive bowel sounds, no guarding, 

no rebound, no hepatosplenomegaly, no masses.


EXTREMITIES: 2+ pulses, warm, well-perfused, no edema. 


NEUROLOGICAL: Cranial nerves II through XII grossly intact. Normal speech, gait 

not observed.


PSYCH: Normal mood, normal affect.


SKIN: Warm, dry, normal turgor, no rashes or lesions noted.





LABS


 Laboratory Results - last 24 hr











  09/05/19 09/05/19





  06:54 07:14


 


WBC   5.9


 


RBC   3.63


 


Hgb   9.2 L


 


Hct   28.3 L


 


MCV   78.2 L


 


MCH   25.3 L


 


MCHC   32.4


 


RDW   20.3 H


 


Plt Count   510 H


 


MPV   7.7


 


POC Glucometer  168 











HOSPITAL COURSE:





Date of Admission:09/01/19





Date of Discharge: 09/05/19














Discharge Summary


Reason For Visit: rheumatoid arthritis


Current Active Problems





Rheumatoid arthritis (Acute)


Subluxation of left little finger (Acute)








Condition: Good





- Instructions


Diet, Activity, Other Instructions: 


You were in the hospital because of a fall. 





You need to follow up with the following doctors:


Primary Care within 3-5 days


Dr. Santana, Orthopedics


Dr. Og (or your own out pt rheumatologist), Rheumatology


Dr. Delgadillo, Podiatry





You are being sent with the following medications:


Tylenol 325 by mouth every 6 hours


Pantoprazole 40 mg by mouth daily


Daily vit D/Calcium supplement





Please continue your other medications as before.





You will need a DEXA scan. Ask your primary care doctor about having this done.








Referrals: 


Alessandro Og MD [Staff Physician] - 


Barak Santana MD [Staff Physician] - 


Tara Delgadillo DPM [Staff Physician] - 


Disposition: SKILLED NURSING FACILITY





- Home Medications


Comprehensive Discharge Medication List: 


Ambulatory Orders





Ferrous Sulfate [Iron] 325 mg PO DAILY 09/01/19 


predniSONE [Deltasone -] 15 mg PO DAILY 09/01/19 


Acetaminophen [Tylenol .Regular Strength -] 650 mg PO Q4H PRN  tablet 09/05/19 


Calcium Carb, Citrate/Vit D3 [Calcium + D3 ER Tablet] 1 each PO DAILY #30 

tablet.er 09/05/19 


Docusate Sodium [Colace -] 100 mg PO TID  capsule 09/05/19 


Enoxaparin [Lovenox -] 40 mg SQ DAILY  disp.syrin 09/05/19 


Ferrous Sulfate [Feosol] 325 mg PO DAILY  ud 09/05/19 


Folic Acid - 1 mg PO DAILY  tablet 09/05/19 


Methotrexate [Mexate -] 20 mg PO Mo@1000  tablet 09/05/19 


Naproxen [Naprosyn -] 500 mg PO BID  tablet 09/05/19 


Pantoprazole Sodium [Protonix -] 40 mg PO DAILY  tablet.ec 09/05/19 


Polyethylene Glycol 3350 [Miralax 119 gm Btl -] 17 gm PO DAILY PRN  bottle 09/05 /19 


Sennosides [Senna -] 2 tab PO HS  tablet 09/05/19 


Sulfasalazine [Azulfidine En-Tabs -] 1,000 mg PO BID  tablet. 09/05/19 


oxyCODONE HCL [Roxicodone -] 5 mg PO Q6H PRN  tablet MDD 20 09/05/19 











- Discharge Referral


Referred to [facility] No





ATTENDING PHYSICIAN STATEMENT





I saw and evaluated the patient.


I reviewed the resident's note and discussed the case with the resident.


I agree with the resident's findings and plan as documented.








SUBJECTIVE:








OBJECTIVE:








ASSESSMENT AND PLAN:

## 2019-09-05 NOTE — PN
Teaching Attending Note


Name of Resident: Scott Kaur





ATTENDING PHYSICIAN STATEMENT





I saw and evaluated the patient.


I reviewed the resident's note and discussed the case with the resident.


I agree with the resident's findings and plan as documented.








Seen and examined; please see resident note for further historical information.

  She is doing well today with no new complaints; she was seen by rheumatology.

  Continuing current meds, assess for biologic agent as OP with her rheum.  

Pending rehab.





VS, labs, imaging reviewed.


NAD, AAO, resting in bed


RRR s1/2


Splinted; neurovasc. intact.  ROM unchanged.


Lungs CTAB, s1/2


Normal mood, appropriate behavior.  Good spirits.





In summation, a 43 y/o female with severe RA presenting with a L-hand fx that 

is s/p splinting by Orthopedic Sgy; she is on steroid taper and DMARDs for her 

advanced AI condition and has appreciated some improvement. She was recently at 

rehab for related issues and has some impaired functional status resulting from 

her disease.  Problems include:





-L-hand 5th digit subluxation  (Followup with orthopedics per instructions; L-

hand to volar gutter splint.  Improved and remains neurovasc intact.  Rehab 

pending)


-R-Patellar sublux (stable; PRN management and followup with ortho.  Films 

reviewed and negative; rehab pending)


-R-Hip Pain-improved, no fracture.


-Seropositive RA with polyarticular involvement (on 20mg MTX QMonday, 1 BID 

Sulfa, BID Naproxen, 15 pred with plans to taper.  She has seen Dr. Og in 

the past.  Calling his service to discuss ongoing taper and to arrange for 

followup.  Continue PPI for gi px.)


-Chronic Anemia (continue iron)


-Reactive thrombocytosis (Likely 2/2 RA flare; trending down.  Monitor and FU 

CBC 5-7 days post DC)


-Hypoalbuminemia (Secondary to chronic disease state; consider OP prealbumin 

and nutrition referral)


-Obesity (BMI=36;   prior to DC)





Full code

## 2024-03-25 NOTE — PDOC
*Physical Exam





- Vital Signs


 Last Vital Signs











Temp Pulse Resp BP Pulse Ox


 


 97.6 F   98 H  20   157/88   98 


 


 02/14/19 00:55  02/14/19 00:55  02/14/19 00:55  02/14/19 00:55  02/14/19 00:55














Medical Decision Making





- Medical Decision Making





02/14/19 02:05


Patient seen by the advanced practice provider under my direct supervision. 

Ancillary testing reviewed as necessary.


I agree with plan as outlined by the advanced practice provider.





*DC/Admit/Observation/Transfer


Diagnosis at time of Disposition: 


 Inability to bear weight





Ankle pain, right


Qualifiers:


 Chronicity: acute Qualified Code(s): M25.571 - Pain in right ankle and joints 

of right foot





Shoulder pain, right


Qualifiers:


 Chronicity: acute Qualified Code(s): M25.511 - Pain in right shoulder








- Referrals





- Patient Instructions





- Post Discharge Activity
*Physical Exam





- Vital Signs


 Last Vital Signs











Temp Pulse Resp BP Pulse Ox


 


 98.4 F   71   17   132/76   98 


 


 02/14/19 06:53  02/14/19 06:53  02/14/19 06:53  02/14/19 06:53  02/14/19 06:53














ED Treatment Course





- LABORATORY


CBC & Chemistry Diagram: 


 02/14/19 05:18





 02/14/19 05:18





- ADDITIONAL ORDERS


Additional order review: 


 Laboratory  Results











  02/14/19 02/14/19





  05:18 05:18


 


WBC   7.3


 


RBC   3.33 L


 


Hgb   8.1 L


 


Hct   24.7 L D


 


MCV   74.2 L


 


MCH   24.2 L D


 


MCHC   32.7


 


RDW   18.1 H


 


Plt Count   628 H D


 


MPV   7.4 L


 


Absolute Neuts (auto)   5.0


 


Neutrophils %   69.5  D


 


Lymphocytes %   23.1  D


 


Monocytes %   4.5


 


Eosinophils %   1.9  D


 


Basophils %   1.0


 


Nucleated RBC %   0


 


Sodium  140 


 


Potassium  3.5 


 


Chloride  105 


 


Carbon Dioxide  27 


 


Anion Gap  8 


 


BUN  8 


 


Creatinine  0.6 


 


Creat Clearance w eGFR  > 60 


 


Random Glucose  106 


 


Calcium  8.6 


 


Total Bilirubin  0.2 


 


AST  8 L 


 


ALT  8 L 


 


Alkaline Phosphatase  65 


 


Troponin I  < 0.02 


 


Total Protein  7.3 


 


Albumin  2.6 L 








 











  02/14/19





  05:18


 


RBC  3.33 L


 


MCV  74.2 L


 


MCHC  32.7


 


RDW  18.1 H


 


MPV  7.4 L


 


Neutrophils %  69.5  D


 


Lymphocytes %  23.1  D


 


Monocytes %  4.5


 


Eosinophils %  1.9  D


 


Basophils %  1.0














- Medications


Given in the ED: 


ED Medications














Discontinued Medications














Generic Name Dose Route Start Last Admin





  Trade Name Hasmukh  PRN Reason Stop Dose Admin


 


Ketorolac Tromethamine  30 mg  02/14/19 02:53  02/14/19 03:21





  Toradol Injection -  IM  02/14/19 02:54  30 mg





  ONCE ONE   Administration





     





     





     





     














Medical Decision Making





- Medical Decision Making








Patient signed out to me by PAULINE Bautista


Patient currently resting in NAD, requesting pain medications


Patient mentions she has been in rehab before


Currently has walker and wheelchair, but states feels she needs more rehab


States though she lives with family, she feels like it has become burdensome 

for her family





Patient ordered for her AM home meds: Naprosyn and Prednisone


Waiting to hear back from hospitalist team





02/14/19 07:18





Patient admitted under obs serve Dr. Crandall





02/14/19 07:39








*DC/Admit/Observation/Transfer


Diagnosis at time of Disposition: 


 Inability to bear weight





Ankle pain, right


Qualifiers:


 Chronicity: acute Qualified Code(s): M25.571 - Pain in right ankle and joints 

of right foot





Shoulder pain, right


Qualifiers:


 Chronicity: acute Qualified Code(s): M25.511 - Pain in right shoulder








- Discharge Dispostion


Condition at time of disposition: Stable


Decision to Admit order: Yes





- Referrals





- Patient Instructions





- Post Discharge Activity
History of Present Illness





- General


Chief Complaint: Injury


Stated Complaint: FALL


Time Seen by Provider: 02/14/19 01:55


History Source: Patient





- History of Present Illness


Initial Comments: 





02/14/19 02:28


43 year old female with a history of rheumatoid arthritis presents s/p fall " 

my leg gave out" c/o right ankle pain unable to weight bear and right shoulder 

pain with inability to raise arm. no clavicle pain. denies hip pain, head/ neck 

or head injury patient reports that she is unable to weight bear since the 

fall. " i think i need rehab again, i was unable to walk since the fall. 


02/14/19 02:51








Past History





- Past Medical History


Allergies/Adverse Reactions: 


 Allergies











Allergy/AdvReac Type Severity Reaction Status Date / Time


 


No Known Allergies Allergy   Verified 02/14/19 01:54











Home Medications: 


Ambulatory Orders





Ferrous Sulfate [Feosol] 325 mg PO BIDWM  ud 02/15/19 


Folic Acid - 1 mg PO DAILY  tablet 02/15/19 


Nicotine Patch [Nicoderm Patch -] 14 mg TD DAILY  patch 02/15/19 


predniSONE [Deltasone -] 15 mg PO DAILY  tablet 02/15/19 








Anemia: Yes


COPD: No





- Suicide/Smoking/Psychosocial Hx


Smoking History: Never smoked


Have you smoked in the past 12 months: No


Number of Cigarettes Smoked Daily: 4


Cigars Per Day: 0


Information on smoking cessation initiated: No


'Breaking Loose' booklet given: 09/25/18


Hx Alcohol Use: No


Drug/Substance Use Hx: No


Substance Use Type: None


Hx Substance Use Treatment: No





**Review of Systems





- Review of Systems


Able to Perform ROS?: Yes


Is the patient limited English proficient: No


Constitutional: No: Symptoms Reported, See HPI, Chills, Diaphoresis, Fever, 

Loss of Appetite, Malaise, Night Sweats, Weakness, Weight Stable, Unintentional 

Wgt. Loss, Unexplained wgt Loss, Other


Musculoskeletal: Yes: Other (leg pain)





*Physical Exam





- Vital Signs


 Last Vital Signs











Temp Pulse Resp BP Pulse Ox


 


 97.6 F   98 H  20   157/88   98 


 


 02/14/19 00:55  02/14/19 00:55  02/14/19 00:55  02/14/19 00:55  02/14/19 00:55














- Physical Exam


General Appearance: Yes: Appropriately Dressed


Respiratory/Chest: positive: Lungs Clear, Normal Breath Sounds


Gastrointestinal/Abdominal: positive: Normal Bowel Sounds, Soft.  negative: 

Tender


Extremity: positive: Normal Capillary Refill, Normal Inspection, Normal Range 

of Motion, Other (has swans neck deformity of fingers)


Integumentary: positive: Normal Color, Dry, Warm


Neurologic: positive: Fully Oriented, Alert, Normal Mood/Affect





Moderate Sedation





- Procedure Monitoring


Vital Signs: 


Procedure Monitoring Vital Signs











Temperature  97.6 F   02/14/19 00:55


 


Pulse Rate  98 H  02/14/19 00:55


 


Respiratory Rate  20   02/14/19 00:55


 


Blood Pressure  157/88   02/14/19 00:55


 


O2 Sat by Pulse Oximetry (%)  98   02/14/19 00:55











ED Treatment Course





- LABORATORY


CBC & Chemistry Diagram: 


 02/17/19 06:20





 02/17/19 06:20





Progress Note





- Progress Note


Progress Note: 





A: inability to weight bear





P: xray





pain control





Medical Decision Making





- Medical Decision Making





02/14/19 04:33


No fracture or dislocation. No joint effusion. There are degenerative


changes of the midfoot, advanced for a. Small plantar heel spur is noted.


02/14/19 04:37


patient unable to weight bear patient now reports that she had chest pain 

before improved since toradol dose . will do admission labs and place on obs 

for rehab evaluation


02/14/19 04:47





02/14/19 06:11


patient pending obs placement with hospitalist team. pending sign out. need pt 

eval inability of weight bear. microblog send. 





*DC/Admit/Observation/Transfer


Diagnosis at time of Disposition: 


 Inability to bear weight





Ankle pain, right


Qualifiers:


 Chronicity: acute Qualified Code(s): M25.571 - Pain in right ankle and joints 

of right foot





Shoulder pain, right


Qualifiers:


 Chronicity: acute Qualified Code(s): M25.511 - Pain in right shoulder








- Discharge Dispostion


Condition at time of disposition: Stable


Decision to Admit order: Yes





- Referrals





- Patient Instructions





- Post Discharge Activity
Awake/Alert/Cooperative